# Patient Record
Sex: MALE | Race: OTHER | URBAN - METROPOLITAN AREA
[De-identification: names, ages, dates, MRNs, and addresses within clinical notes are randomized per-mention and may not be internally consistent; named-entity substitution may affect disease eponyms.]

---
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
## 2021-10-22 ENCOUNTER — EMERGENCY (EMERGENCY)
Facility: HOSPITAL | Age: 21
LOS: 0 days | Discharge: HOME | End: 2021-10-22
Attending: EMERGENCY MEDICINE | Admitting: EMERGENCY MEDICINE
Payer: MEDICAID

## 2021-10-22 VITALS
OXYGEN SATURATION: 98 % | WEIGHT: 160.06 LBS | SYSTOLIC BLOOD PRESSURE: 115 MMHG | RESPIRATION RATE: 18 BRPM | DIASTOLIC BLOOD PRESSURE: 58 MMHG | TEMPERATURE: 97 F | HEART RATE: 64 BPM

## 2021-10-22 DIAGNOSIS — J45.901 UNSPECIFIED ASTHMA WITH (ACUTE) EXACERBATION: ICD-10-CM

## 2021-10-22 DIAGNOSIS — Z76.0 ENCOUNTER FOR ISSUE OF REPEAT PRESCRIPTION: ICD-10-CM

## 2021-10-22 PROCEDURE — 99284 EMERGENCY DEPT VISIT MOD MDM: CPT

## 2021-10-22 RX ORDER — ALBUTEROL 90 UG/1
3 AEROSOL, METERED ORAL
Qty: 60 | Refills: 0
Start: 2021-10-22 | End: 2021-10-26

## 2021-10-22 NOTE — ED PROVIDER NOTE - NSFOLLOWUPCLINICS_GEN_ALL_ED_FT
[facility]  Ped Pulmonology  [id number] [place] [address]  Phone: [phone]  Fax:   Follow Up Time: 1-3 Days

## 2021-10-22 NOTE — ED PROVIDER NOTE - OBJECTIVE STATEMENT
20yom w/ pmhx of asthma (never intubated, hospitalized for it once) who present for asthma exacerbation. he usually get this when he goes running or is around cats. today he was running and then couldn't find his albuterol nebulizer machine so he called ems. ems gave him 2 rounds of albuterol duonebs en route and now his symptoms have resolved. he does not feel like he needs any additional treatment. he is requesting albuterol prescription. no f c n v cp sob.

## 2021-10-22 NOTE — ED PROVIDER NOTE - NS ED ROS FT
Constitutional: No fever   Eyes:  No visual changes  Ears:  No hearing changes  Neck: No neck pain  Cardiac:  No chest pain  Respiratory:  +SOB   GI:  No abdominal pain, nausea, or vomiting  :  No dysuria  MS:  No back pain  Neuro:  No headache or weakness.  No LOC  Skin:  No skin rash

## 2021-10-22 NOTE — ED PROVIDER NOTE - PHYSICAL EXAMINATION
CONSTITUTIONAL: well-developed, well-nourished, in no acute distress  SKIN: warm, dry  HEAD: Normocephalic  EYES: no conjunctival erythema  ENT: no nasal discharge, airway clear  NECK: full ROM  CARD: regular rate and rhythm  RESP: normal respiratory effort, no wheezes, rales or rhonchi, good air movements  ABD: soft, non-distended, non-tender  EXT: moving all extremities spontaneously  NEURO: alert and oriented, grossly unremarkable  PSYCH: cooperative, appropriate

## 2021-10-22 NOTE — ED PROVIDER NOTE - PATIENT PORTAL LINK FT
You can access the FollowMyHealth Patient Portal offered by [facility] by registering at the following website: [email or web address]. By joining [employer]’s FollowMyHealth portal, you will also be able to view your health information using other applications (apps) compatible with our system.

## 2021-10-22 NOTE — ED PROVIDER NOTE - ATTENDING CONTRIBUTION TO CARE
pt with asthma exac. resolved with nebs pta. requesting neb filter apparatus and dc home. has no complaints in ED.    lungs clear. exam nml.    supportive care  dc

## 2021-10-22 NOTE — ED PROVIDER NOTE - DISPOSITION TYPE
Please clarify if this patient is being treated/managed for:    =>   chronic diastolic  CHF  =>Other Explanation of clinical findings  =>Unable to Determine (no explanation of clinical findings)    The medical record reflects the following clinical findings, treatment, and risk factors:    Pt s/p  THR    med consult  notes h/o  CHF  with moderate diastolic dysfunction  med  plan \" · Start Coreg tonight, restart lisinopril, HCTZ, and Imdur post-op day 1 pending that there is no significant bump it pt's Cr. Start Statin post-op day 1. Hold aspirin and plavix until Okayed by Ortho. PRN Nitro for angina. \"      Please clarify and document your clinical opinion in the progress notes and discharge summary including the definitive and/or presumptive diagnosis, (suspected or probable), related to the above clinical findings. Please include clinical findings supporting your diagnosis. If you DECLINE this query or would like to communicate with [facility], please utilize the \"[facility] message box\" at the TOP of the Progress Note on the right. QUESTIONS?    Argelia Reid RN BSN  CCDS [phone]
Please clarify stage of pt's CKD  noted in  consult. Pt's  GFR-    46                        **Chronic Kidney Disease (CKD), stages 1-5. Documenting the stage of CKD will improve data integrity and will help clarify vague terms such as \"renal insufficiency\" or \"chronic renal failure. \" The stages of CKD according to the [facility] are as follows:     Stage I: GFR >90   Stage II: GFR 60-89   Stage III: GFR 30-59   Stage IV: GFR 15-29   Stage V: GFR <15    =>  =>Other Explanation of clinical findings  =>Unable to Determine (no explanation of clinical findings)      QUESTIONS?    Rachael Souza RN BSN  CCDS [phone]
DISCHARGE

## 2021-12-19 ENCOUNTER — EMERGENCY (EMERGENCY)
Facility: HOSPITAL | Age: 21
LOS: 0 days | Discharge: HOME | End: 2021-12-20
Attending: EMERGENCY MEDICINE | Admitting: EMERGENCY MEDICINE
Payer: MEDICAID

## 2021-12-19 DIAGNOSIS — R07.89 OTHER CHEST PAIN: ICD-10-CM

## 2021-12-19 DIAGNOSIS — R09.81 NASAL CONGESTION: ICD-10-CM

## 2021-12-19 DIAGNOSIS — J45.901 UNSPECIFIED ASTHMA WITH (ACUTE) EXACERBATION: ICD-10-CM

## 2021-12-19 DIAGNOSIS — Z20.822 CONTACT WITH AND (SUSPECTED) EXPOSURE TO COVID-19: ICD-10-CM

## 2021-12-19 DIAGNOSIS — J02.9 ACUTE PHARYNGITIS, UNSPECIFIED: ICD-10-CM

## 2021-12-19 DIAGNOSIS — B34.1 ENTEROVIRUS INFECTION, UNSPECIFIED: ICD-10-CM

## 2021-12-19 DIAGNOSIS — R06.02 SHORTNESS OF BREATH: ICD-10-CM

## 2021-12-19 DIAGNOSIS — F17.200 NICOTINE DEPENDENCE, UNSPECIFIED, UNCOMPLICATED: ICD-10-CM

## 2021-12-19 PROCEDURE — 99285 EMERGENCY DEPT VISIT HI MDM: CPT

## 2021-12-20 ENCOUNTER — TRANSCRIPTION ENCOUNTER (OUTPATIENT)
Age: 21
End: 2021-12-20

## 2021-12-20 ENCOUNTER — INPATIENT (INPATIENT)
Facility: HOSPITAL | Age: 21
LOS: 0 days | Discharge: HOME | End: 2021-12-21
Attending: PEDIATRICS | Admitting: PEDIATRICS
Payer: MEDICAID

## 2021-12-20 VITALS
HEART RATE: 93 BPM | TEMPERATURE: 100 F | OXYGEN SATURATION: 100 % | RESPIRATION RATE: 18 BRPM | DIASTOLIC BLOOD PRESSURE: 77 MMHG | SYSTOLIC BLOOD PRESSURE: 156 MMHG

## 2021-12-20 VITALS
TEMPERATURE: 98 F | OXYGEN SATURATION: 100 % | SYSTOLIC BLOOD PRESSURE: 127 MMHG | DIASTOLIC BLOOD PRESSURE: 70 MMHG | RESPIRATION RATE: 18 BRPM | HEART RATE: 92 BPM

## 2021-12-20 VITALS
HEART RATE: 92 BPM | TEMPERATURE: 99 F | SYSTOLIC BLOOD PRESSURE: 130 MMHG | RESPIRATION RATE: 18 BRPM | OXYGEN SATURATION: 100 % | DIASTOLIC BLOOD PRESSURE: 85 MMHG | WEIGHT: 149.91 LBS

## 2021-12-20 DIAGNOSIS — J45.909 UNSPECIFIED ASTHMA, UNCOMPLICATED: ICD-10-CM

## 2021-12-20 DIAGNOSIS — J98.2 INTERSTITIAL EMPHYSEMA: ICD-10-CM

## 2021-12-20 LAB
ALBUMIN SERPL ELPH-MCNC: 5.2 G/DL — SIGNIFICANT CHANGE UP (ref 3.5–5.2)
ALBUMIN SERPL ELPH-MCNC: 5.2 G/DL — SIGNIFICANT CHANGE UP (ref 3.5–5.2)
ALP SERPL-CCNC: 82 U/L — SIGNIFICANT CHANGE UP (ref 30–115)
ALP SERPL-CCNC: 85 U/L — SIGNIFICANT CHANGE UP (ref 30–115)
ALT FLD-CCNC: 16 U/L — SIGNIFICANT CHANGE UP (ref 13–38)
ALT FLD-CCNC: 18 U/L — SIGNIFICANT CHANGE UP (ref 13–38)
ANION GAP SERPL CALC-SCNC: 18 MMOL/L — HIGH (ref 7–14)
ANION GAP SERPL CALC-SCNC: 19 MMOL/L — HIGH (ref 7–14)
AST SERPL-CCNC: 18 U/L — SIGNIFICANT CHANGE UP (ref 13–38)
AST SERPL-CCNC: 19 U/L — SIGNIFICANT CHANGE UP (ref 13–38)
BASE EXCESS BLDV CALC-SCNC: 1.3 MMOL/L — SIGNIFICANT CHANGE UP (ref -2–3)
BASOPHILS # BLD AUTO: 0.03 K/UL — SIGNIFICANT CHANGE UP (ref 0–0.2)
BASOPHILS # BLD AUTO: 0.05 K/UL — SIGNIFICANT CHANGE UP (ref 0–0.2)
BASOPHILS NFR BLD AUTO: 0.2 % — SIGNIFICANT CHANGE UP (ref 0–1)
BASOPHILS NFR BLD AUTO: 0.3 % — SIGNIFICANT CHANGE UP (ref 0–1)
BILIRUB SERPL-MCNC: 0.4 MG/DL — SIGNIFICANT CHANGE UP (ref 0.2–1.2)
BILIRUB SERPL-MCNC: 0.7 MG/DL — SIGNIFICANT CHANGE UP (ref 0.2–1.2)
BUN SERPL-MCNC: 11 MG/DL — SIGNIFICANT CHANGE UP (ref 10–20)
BUN SERPL-MCNC: 13 MG/DL — SIGNIFICANT CHANGE UP (ref 10–20)
CA-I SERPL-SCNC: 1.25 MMOL/L — SIGNIFICANT CHANGE UP (ref 1.15–1.33)
CALCIUM SERPL-MCNC: 10.4 MG/DL — HIGH (ref 8.5–10.1)
CALCIUM SERPL-MCNC: 10.4 MG/DL — HIGH (ref 8.5–10.1)
CHLORIDE SERPL-SCNC: 101 MMOL/L — SIGNIFICANT CHANGE UP (ref 98–110)
CHLORIDE SERPL-SCNC: 99 MMOL/L — SIGNIFICANT CHANGE UP (ref 98–110)
CO2 SERPL-SCNC: 17 MMOL/L — SIGNIFICANT CHANGE UP (ref 17–32)
CO2 SERPL-SCNC: 19 MMOL/L — SIGNIFICANT CHANGE UP (ref 17–32)
CREAT SERPL-MCNC: 1 MG/DL — SIGNIFICANT CHANGE UP (ref 0.7–1.5)
CREAT SERPL-MCNC: 1 MG/DL — SIGNIFICANT CHANGE UP (ref 0.7–1.5)
EOSINOPHIL # BLD AUTO: 0.02 K/UL — SIGNIFICANT CHANGE UP (ref 0–0.7)
EOSINOPHIL # BLD AUTO: 0.4 K/UL — SIGNIFICANT CHANGE UP (ref 0–0.7)
EOSINOPHIL NFR BLD AUTO: 0.1 % — SIGNIFICANT CHANGE UP (ref 0–8)
EOSINOPHIL NFR BLD AUTO: 2.6 % — SIGNIFICANT CHANGE UP (ref 0–8)
GAS PNL BLDV: 134 MMOL/L — LOW (ref 136–145)
GAS PNL BLDV: SIGNIFICANT CHANGE UP
GLUCOSE SERPL-MCNC: 108 MG/DL — HIGH (ref 70–99)
GLUCOSE SERPL-MCNC: 82 MG/DL — SIGNIFICANT CHANGE UP (ref 70–99)
HCO3 BLDV-SCNC: 25 MMOL/L — SIGNIFICANT CHANGE UP (ref 22–29)
HCT VFR BLD CALC: 43.2 % — SIGNIFICANT CHANGE UP (ref 42–52)
HCT VFR BLD CALC: 47.1 % — SIGNIFICANT CHANGE UP (ref 42–52)
HCT VFR BLDA CALC: 46 % — SIGNIFICANT CHANGE UP (ref 39–51)
HGB BLD CALC-MCNC: 15.3 G/DL — SIGNIFICANT CHANGE UP (ref 12.6–17.4)
HGB BLD-MCNC: 15.5 G/DL — SIGNIFICANT CHANGE UP (ref 14–18)
HGB BLD-MCNC: 16.4 G/DL — SIGNIFICANT CHANGE UP (ref 14–18)
IMM GRANULOCYTES NFR BLD AUTO: 0.3 % — SIGNIFICANT CHANGE UP (ref 0.1–0.3)
IMM GRANULOCYTES NFR BLD AUTO: 0.4 % — HIGH (ref 0.1–0.3)
LACTATE BLDV-MCNC: 1.4 MMOL/L — SIGNIFICANT CHANGE UP (ref 0.5–2)
LYMPHOCYTES # BLD AUTO: 1.13 K/UL — LOW (ref 1.2–3.4)
LYMPHOCYTES # BLD AUTO: 1.38 K/UL — SIGNIFICANT CHANGE UP (ref 1.2–3.4)
LYMPHOCYTES # BLD AUTO: 6.9 % — LOW (ref 20.5–51.1)
LYMPHOCYTES # BLD AUTO: 9.1 % — LOW (ref 20.5–51.1)
MAGNESIUM SERPL-MCNC: 2.3 MG/DL — SIGNIFICANT CHANGE UP (ref 1.8–2.4)
MAGNESIUM SERPL-MCNC: 2.3 MG/DL — SIGNIFICANT CHANGE UP (ref 1.8–2.4)
MCHC RBC-ENTMCNC: 29.9 PG — SIGNIFICANT CHANGE UP (ref 27–31)
MCHC RBC-ENTMCNC: 30.8 PG — SIGNIFICANT CHANGE UP (ref 27–31)
MCHC RBC-ENTMCNC: 34.8 G/DL — SIGNIFICANT CHANGE UP (ref 32–37)
MCHC RBC-ENTMCNC: 35.9 G/DL — SIGNIFICANT CHANGE UP (ref 32–37)
MCV RBC AUTO: 85.9 FL — SIGNIFICANT CHANGE UP (ref 80–94)
MCV RBC AUTO: 85.9 FL — SIGNIFICANT CHANGE UP (ref 80–94)
MONOCYTES # BLD AUTO: 1.32 K/UL — HIGH (ref 0.1–0.6)
MONOCYTES # BLD AUTO: 1.5 K/UL — HIGH (ref 0.1–0.6)
MONOCYTES NFR BLD AUTO: 8.7 % — SIGNIFICANT CHANGE UP (ref 1.7–9.3)
MONOCYTES NFR BLD AUTO: 9.2 % — SIGNIFICANT CHANGE UP (ref 1.7–9.3)
NEUTROPHILS # BLD AUTO: 12 K/UL — HIGH (ref 1.4–6.5)
NEUTROPHILS # BLD AUTO: 13.52 K/UL — HIGH (ref 1.4–6.5)
NEUTROPHILS NFR BLD AUTO: 79 % — HIGH (ref 42.2–75.2)
NEUTROPHILS NFR BLD AUTO: 83.2 % — HIGH (ref 42.2–75.2)
NRBC # BLD: 0 /100 WBCS — SIGNIFICANT CHANGE UP (ref 0–0)
NRBC # BLD: 0 /100 WBCS — SIGNIFICANT CHANGE UP (ref 0–0)
PCO2 BLDV: 36 MMHG — LOW (ref 42–55)
PH BLDV: 7.45 — HIGH (ref 7.32–7.43)
PLATELET # BLD AUTO: 220 K/UL — SIGNIFICANT CHANGE UP (ref 130–400)
PLATELET # BLD AUTO: 226 K/UL — SIGNIFICANT CHANGE UP (ref 130–400)
PO2 BLDV: 31 MMHG — SIGNIFICANT CHANGE UP
POTASSIUM BLDV-SCNC: 3.5 MMOL/L — SIGNIFICANT CHANGE UP (ref 3.5–5.1)
POTASSIUM SERPL-MCNC: 3.7 MMOL/L — SIGNIFICANT CHANGE UP (ref 3.5–5)
POTASSIUM SERPL-MCNC: 4.1 MMOL/L — SIGNIFICANT CHANGE UP (ref 3.5–5)
POTASSIUM SERPL-SCNC: 3.7 MMOL/L — SIGNIFICANT CHANGE UP (ref 3.5–5)
POTASSIUM SERPL-SCNC: 4.1 MMOL/L — SIGNIFICANT CHANGE UP (ref 3.5–5)
PROT SERPL-MCNC: 8.6 G/DL — HIGH (ref 6–8)
PROT SERPL-MCNC: 8.7 G/DL — HIGH (ref 6–8)
RAPID RVP RESULT: DETECTED
RBC # BLD: 5.03 M/UL — SIGNIFICANT CHANGE UP (ref 4.7–6.1)
RBC # BLD: 5.48 M/UL — SIGNIFICANT CHANGE UP (ref 4.7–6.1)
RBC # FLD: 12.2 % — SIGNIFICANT CHANGE UP (ref 11.5–14.5)
RBC # FLD: 12.6 % — SIGNIFICANT CHANGE UP (ref 11.5–14.5)
RV+EV RNA SPEC QL NAA+PROBE: DETECTED
SAO2 % BLDV: 56 % — SIGNIFICANT CHANGE UP
SARS-COV-2 RNA SPEC QL NAA+PROBE: SIGNIFICANT CHANGE UP
SODIUM SERPL-SCNC: 135 MMOL/L — SIGNIFICANT CHANGE UP (ref 135–146)
SODIUM SERPL-SCNC: 138 MMOL/L — SIGNIFICANT CHANGE UP (ref 135–146)
WBC # BLD: 15.2 K/UL — HIGH (ref 4.8–10.8)
WBC # BLD: 16.27 K/UL — HIGH (ref 4.8–10.8)
WBC # FLD AUTO: 15.2 K/UL — HIGH (ref 4.8–10.8)
WBC # FLD AUTO: 16.27 K/UL — HIGH (ref 4.8–10.8)

## 2021-12-20 PROCEDURE — 71046 X-RAY EXAM CHEST 2 VIEWS: CPT | Mod: 26

## 2021-12-20 PROCEDURE — 71045 X-RAY EXAM CHEST 1 VIEW: CPT | Mod: 26,59

## 2021-12-20 PROCEDURE — 99232 SBSQ HOSP IP/OBS MODERATE 35: CPT

## 2021-12-20 PROCEDURE — 99285 EMERGENCY DEPT VISIT HI MDM: CPT

## 2021-12-20 PROCEDURE — 99222 1ST HOSP IP/OBS MODERATE 55: CPT

## 2021-12-20 RX ORDER — IPRATROPIUM/ALBUTEROL SULFATE 18-103MCG
3 AEROSOL WITH ADAPTER (GRAM) INHALATION ONCE
Refills: 0 | Status: COMPLETED | OUTPATIENT
Start: 2021-12-20 | End: 2021-12-20

## 2021-12-20 RX ORDER — MONTELUKAST 4 MG/1
10 TABLET, CHEWABLE ORAL AT BEDTIME
Refills: 0 | Status: DISCONTINUED | OUTPATIENT
Start: 2021-12-20 | End: 2021-12-21

## 2021-12-20 RX ORDER — ALBUTEROL 90 UG/1
2 AEROSOL, METERED ORAL ONCE
Refills: 0 | Status: DISCONTINUED | OUTPATIENT
Start: 2021-12-20 | End: 2021-12-20

## 2021-12-20 RX ORDER — MAGNESIUM SULFATE 500 MG/ML
2 VIAL (ML) INJECTION ONCE
Refills: 0 | Status: COMPLETED | OUTPATIENT
Start: 2021-12-20 | End: 2021-12-20

## 2021-12-20 RX ORDER — FLUTICASONE PROPIONATE 220 MCG
2 AEROSOL WITH ADAPTER (GRAM) INHALATION
Refills: 0 | Status: DISCONTINUED | OUTPATIENT
Start: 2021-12-20 | End: 2021-12-21

## 2021-12-20 RX ORDER — IBUPROFEN 200 MG
200 TABLET ORAL EVERY 6 HOURS
Refills: 0 | Status: DISCONTINUED | OUTPATIENT
Start: 2021-12-20 | End: 2021-12-21

## 2021-12-20 RX ORDER — ALBUTEROL 90 UG/1
2 AEROSOL, METERED ORAL
Qty: 18 | Refills: 0
Start: 2021-12-20 | End: 2022-01-03

## 2021-12-20 RX ORDER — DEXAMETHASONE 0.5 MG/5ML
6 ELIXIR ORAL ONCE
Refills: 0 | Status: COMPLETED | OUTPATIENT
Start: 2021-12-20 | End: 2021-12-20

## 2021-12-20 RX ORDER — ALBUTEROL 90 UG/1
2 AEROSOL, METERED ORAL
Refills: 0 | Status: DISCONTINUED | OUTPATIENT
Start: 2021-12-20 | End: 2021-12-21

## 2021-12-20 RX ORDER — ACETAMINOPHEN 500 MG
650 TABLET ORAL EVERY 6 HOURS
Refills: 0 | Status: DISCONTINUED | OUTPATIENT
Start: 2021-12-20 | End: 2021-12-21

## 2021-12-20 RX ORDER — IPRATROPIUM/ALBUTEROL SULFATE 18-103MCG
3 AEROSOL WITH ADAPTER (GRAM) INHALATION
Refills: 0 | Status: DISCONTINUED | OUTPATIENT
Start: 2021-12-20 | End: 2021-12-21

## 2021-12-20 RX ADMIN — Medication 3 MILLILITER(S): at 20:52

## 2021-12-20 RX ADMIN — ALBUTEROL 2 PUFF(S): 90 AEROSOL, METERED ORAL at 23:00

## 2021-12-20 RX ADMIN — Medication 3 MILLILITER(S): at 01:29

## 2021-12-20 RX ADMIN — Medication 3 MILLILITER(S): at 00:47

## 2021-12-20 RX ADMIN — Medication 150 GRAM(S): at 20:19

## 2021-12-20 RX ADMIN — Medication 6 MILLIGRAM(S): at 20:19

## 2021-12-20 RX ADMIN — Medication 3 MILLILITER(S): at 20:19

## 2021-12-20 RX ADMIN — Medication 150 GRAM(S): at 01:29

## 2021-12-20 NOTE — H&P PEDIATRIC - ASSESSMENT
Assessment:    Plan:    Assessment:    Plan:   RESP:  - RA  - Albuterol 90 mcg 2 puff q2h  - Singulair 10 mg QHS  - Flovent 110 mcg 2 puffs BID  - Prednisone 40 mg q12h (starts at 8 am)    CVS:  - Stable    FENGI:  - Soft Pediatric Diet   Assessment: 22 yo M with PMH of asthma presents with chest tightness and neck pain for three days found to have pneumomediastinum on CXR and RE+ admitted for management and monitoring. Vitals and labs are WNL, Patient is tolerating room air without discomfort. Patient CXRs show a decrease in the pneumomediastinum. These are indicative signs of improvement. However on physical exam, lungs sound congested, with wheezing heard and Albuterol q2h will help improve the pneumomediastinum and lung exam. Patient admits to not following a pulmonologist, therefore, plan to set up care with one will help patient control his asthma.  Based on the history, patient presentation is likely due to the combination of rhinoentero virus positive, smoking weed and vaping, in addition to exposure to cats.     Plan:   RESP:  - RA  - Albuterol 90 mcg 2 puff q2h  - Singulair 10 mg QHS  - Flovent 110 mcg 2 puffs BID  - Prednisone 40 mg q12h (starts at 8 am)  - Consult    CVS:  - CVS  - CXR: pneumomediastinum    FENGI:  - Soft, bite-sized Pediatric Diet    ID:  - RE+ (contact precautions)  - COVID negative (12/20)  - Tylenol 650 mg q6 PO PRN for pain  - Motrin 200 mg q6 PO PRN for pain

## 2021-12-20 NOTE — ED PROVIDER NOTE - NS ED ROS FT
Constitutional: See HPI.  Pt eating and drinking normally  Eyes: No discharge, erythema, pain, vision changes.  ENMT: (+) NASAL CONGESTION.  Cardiac: No hx of known congenital defects. CHEST TIGHTNESS  Respiratory: MILD WHEEZES, DRY COUGH   GI: No nausea, vomiting, diarrhea or pain  MS: No muscle weakness, myalgia, joint pain, back pain  Neuro: No headache or weakness. No LOC.  Skin: No skin rash.

## 2021-12-20 NOTE — ED PROVIDER NOTE - ATTENDING CONTRIBUTION TO CARE
20 y/o m w/ pmhx of asthma, previous hospitalizations not bipap pr intubations, seen in ed earlier today for sob, received duonebs and steroid between urgent care visit and then ed visit here, has lab work done and cxr done in ed, cxr showing : "upper pneumomediastinum is noted, which may be related to patient's known asthma.", and (+) enterovirus.     (+) daily marijuana smoker and vaper use. 22 y/o m w/ pmhx of asthma, previous hospitalizations not bipap pr intubations, seen in ed earlier today for sob, received duonebs and steroid between urgent care visit and then ed visit here, has lab work done and cxr done in ed, cxr showing : "upper pneumomediastinum is noted, which may be related to patient's known asthma.", and (+) enterovirus, presents today for xray read. pt reports sob improved but it still present, associated with chest tightness from dry cough, and expiratory wheezing. last took steroids from urgent care earlier but nothing this evening. pt also reports tightness in upper neck, but no sore throat, no neck swelling/stiffness.   No fever, chills, n/v, palpitations, diaphoresis, ear pain, ha/lh/dizziness, numbness/tingling, neck pain/ stiffness, dysphagia, odynophagia, drooling/secretions, abd pain, diarrhea, constipation, melena/brbpr, urinary symptoms, trauma, weakness, edema, calf pain/swelling/erythema, sick contacts, recent travel or rash.  (+) daily marijuana smoker and reports quit vaper use.    on exam: WDWN appearing non toxic appearing sitting on stretcher in nad, speaking full sentences, no rash, mmm, no erythema, exudates, or petechiae, no drooling/secretions, no PTA, no torticollis, no anterior neck pain/stiffness. no meningeal signs, no crepitus to upper neck, no subq emphysema, no rhinorrhea, TM's visualized b/l with good cone of light, no erythema or effusions, regular rate, radial pulses 2/4 b/l, no jvd, congested, breath sounds present b/l, expiratory wheezing present b/l, good respiratory effort, no accessory muscle use, no tachypnea, no stridor, bs present throughout all 4 quadrants, abd soft, nd, nt, no rebound tenderness or guarding, no cvat, no pelvic pain to palpation, no suprapubic pain to palpation, FROM of ext, no edema, no calf pain/swelling/erythema, AAOx3.  motor 5/5 and sensation intact throughout upper and lower ext. No focal deficits. 20 y/o m w/ pmhx of asthma, previous hospitalizations not bipap pr intubations, seen in ed earlier today for sob, received duonebs and steroid between urgent care visit and then ed visit here, has lab work done and cxr done in ed, cxr showing : "upper pneumomediastinum is noted, which may be related to patient's known asthma.", and (+) enterovirus, presents today for xray read. pt reports sob improved but it still present, associated with chest tightness from dry cough, and expiratory wheezing. last took steroids from urgent care earlier but nothing this evening. pt also reports tightness in upper neck, but no sore throat, no neck swelling/stiffness.   No fever, chills, n/v, palpitations, diaphoresis, ear pain, ha/lh/dizziness, numbness/tingling, neck pain/ stiffness, dysphagia, odynophagia, drooling/secretions, abd pain, diarrhea, constipation, melena/brbpr, urinary symptoms, trauma, weakness, edema, calf pain/swelling/erythema, sick contacts, recent travel or rash.  (+) daily marijuana smoker and reports quit vaper use. Does not have a pulmonologist.     on exam: WDWN appearing non toxic appearing sitting on stretcher in nad, speaking full sentences, no rash, mmm, no erythema, exudates, or petechiae, no drooling/secretions, no PTA, no torticollis, no anterior neck pain/stiffness. no meningeal signs, no crepitus to upper neck, no subq emphysema, no rhinorrhea, TM's visualized b/l with good cone of light, no erythema or effusions, regular rate, radial pulses 2/4 b/l, no jvd, congested, breath sounds present b/l, expiratory wheezing present b/l, good respiratory effort, no accessory muscle use, no tachypnea, no stridor, saturation 100 on ra at rest and with exertion, bs present throughout all 4 quadrants, abd soft, nd, nt, no rebound tenderness or guarding, no cvat, no pelvic pain to palpation, no suprapubic pain to palpation, FROM of ext, no edema, no calf pain/swelling/erythema, AAOx3.  motor 5/5 and sensation intact throughout upper and lower ext. No focal deficits.

## 2021-12-20 NOTE — H&P PEDIATRIC - NSHPPHYSICALEXAM_GEN_ALL_CORE
Physical Exam:  GENERAL: well-appearing, well nourished, no acute distress, AOx3  HEENT: NCAT, conjunctiva clear and not injected, sclera non-icteric, PERRLA, EACs clear, TMs nonbulging/nonerythematous, nares patent, mucous membranes moist, no mucosal lesions, pharynx nonerythematous, no tonsillar hypertrophy or exudate, neck supple, no cervical lymphadenopathy  HEART: RRR, S1, S2, no rubs, murmurs, or gallops, RP/DP present, cap refill <2 seconds  LUNG: CTAB, no wheezing, no ronchi, no crackles, no retractions, no belly breathing, no tachypnea  ABDOMEN: +BS, soft, nontender, nondistended, no hepatomegaly, no splenomegaly, no hernia  NEURO/MSK: grossly intact  NEURO: CNII-XII grossly intact, EOMI, no dysmetria, DTRs normal b/l, no ataxia, sensation intact to light touch, negative Babinski  MUSCULOSKELETAL: passive and active ROM intact, 5/5 strength upper and lower extremities  SKIN: good turgor, no rash, no bruising or prominent lesions  BACK: spine normal without deformity or tenderness, no CVA tenderness  RECTAL: normal sphincter tone, no hemorrhoids or masses palpable  EXTREMITIES: No amputations or deformities, cyanosis, edema or varicosities, peripheral pulses intact  PSYCHIATRIC: Oriented X3, intact recent and remote memory, judgment and insight, normal mood and affect  MALE : Penis circumcised without lesions, urethral meatus normal location without discharge, testes and epididymides normal size without masses, scrotum without lesions, cremasteric reflex present b/l Physical Exam:  GENERAL: well-appearing, well nourished, no acute distress, AOx3, patient speaking in full sentences, maintaining good eye contact and comprehension   HEENT: NCAT, conjunctiva clear and not injected, sclera non-icteric, PERRLA, nares patent, mucous membranes moist, no mucosal lesions, pharynx nonerythematous, no tonsillar hypertrophy or exudate, neck supple, no cervical lymphadenopathy, tender to touch over right neck area  HEART: RRR, S1, S2, no rubs, murmurs, or gallops, RP/DP present, cap refill <2 seconds  CHEST: Nontender  LUNG: CTAB, wheezing over b/l lung fiel, no ronchi, no crackles, no retractions, no belly breathing, no tachypnea  ABDOMEN: +BS, soft, nontender, nondistended, no hepatomegaly, no splenomegaly, no hernia  BACK: spine normal without deformity or tenderness, no CVA tenderness  EXTREMITIES: No amputations or deformities, cyanosis, edema or varicosities Physical Exam:  GENERAL: well-appearing, well nourished, no acute distress, AOx3, patient speaking in full sentences, maintaining good eye contact and comprehension   HEENT: NCAT, conjunctiva clear and not injected, sclera non-icteric, PERRLA, nares patent, mucous membranes moist, no mucosal lesions, pharynx nonerythematous, no tonsillar hypertrophy or exudate, neck supple, no cervical lymphadenopathy, tender to touch over right neck area  HEART: RRR, S1, S2, no rubs, murmurs, or gallops, RP/DP present, cap refill <2 seconds  CHEST: Nontender  LUNG: CTAB, wheezing over b/l lung fields, Lower lung fields congested, no ronchi, no retractions, no belly breathing, no tachypnea  ABDOMEN: +BS, soft, nontender, nondistended, no hepatomegaly, no splenomegaly, no hernia  BACK: spine normal without deformity or tenderness, no CVA tenderness  EXTREMITIES: No amputations or deformities, cyanosis, edema or varicosities

## 2021-12-20 NOTE — H&P PEDIATRIC - NSHPREVIEWOFSYSTEMS_GEN_ALL_CORE
Review of Systems  Constitutional: (-) fever (-) weakness (-) diaphoresis (+) pain  Eyes: (-) change in vision (-) photophobia (-) eye pain  ENT: (-) sore throat (-) ear pain  (-) nasal discharge (-) congestion  Cardiovascular: (+) chest pain (-) palpitations  Respiratory: (-) SOB (+) cough (-) WOB (-) wheeze (+) tightness  GI: (-) abdominal pain (-) nausea (-) vomiting (+) diarrhea (-) constipation  : (-) dysuria (-) hematuria (-) increased frequency (-) increased urgency  Integumentary: (-) rash (-) redness (-) joint pain (-) MSK pain (-) swelling  Neurological:  (-) focal deficit (-) altered mental status (-) dizziness (-) headache  General: (-) recent travel (+) sick contacts (-) decreased PO (-) urine output

## 2021-12-20 NOTE — ED PROVIDER NOTE - OBJECTIVE STATEMENT
Patient is a 21y M with pmhx of Asthma requiring hospitalizations but no BIPAP or intubations coming in with shortness of breath. Patient went to urgent care and received duonebs and steroids, but his symptoms had not improved. Patient also has been having a sore throat and congestion for the past week as well. He is having difficulty speaking full sentences and catching his breath. Patient says that he is an every day marijuana smoker and vaper and usually he smokes when he gets anxious, but today it did not help him. He says he stopped smoking when he felt short of breath, prior to going to urgent care.

## 2021-12-20 NOTE — H&P PEDIATRIC - NSHPLABSRESULTS_GEN_ALL_CORE
16.4   15.20 )-----------( 226      ( 20 Dec 2021 20:29 )             47.1   12-20    135  |  99  |  13  ----------------------------<  82  4.1   |  17  |  1.0    Ca    10.4<H>      20 Dec 2021 20:29  Mg     2.3     12-20    TPro  8.6<H>  /  Alb  5.2  /  TBili  0.7  /  DBili  x   /  AST  19  /  ALT  16  /  AlkPhos  85  12-20    Magnesium, Serum (12.20.21 @ 20:29)    Magnesium, Serum: 2.3 mg/dL    Comprehensive Metabolic Panel (12.20.21 @ 20:29)    Sodium, Serum: 135 mmol/L    Potassium, Serum: 4.1 mmol/L    Chloride, Serum: 99 mmol/L    Carbon Dioxide, Serum: 17 mmol/L    Anion Gap, Serum: 19 mmol/L    Blood Urea Nitrogen, Serum: 13 mg/dL    Creatinine, Serum: 1.0 mg/dL    Glucose, Serum: 82 mg/dL    Calcium, Total Serum: 10.4 mg/dL    Protein Total, Serum: 8.6 g/dL    Albumin, Serum: 5.2 g/dL    Bilirubin Total, Serum: 0.7 mg/dL    Alkaline Phosphatase, Serum: 85 U/L    Aspartate Aminotransferase (AST/SGOT): 19 U/L    Alanine Aminotransferase (ALT/SGPT): 16 U/L    eGFR if Non : 108

## 2021-12-20 NOTE — ED PROVIDER NOTE - CLINICAL SUMMARY MEDICAL DECISION MAKING FREE TEXT BOX
I have fully discussed the medical management and delivery of care with the patient. I have discussed any available labs, imaging and treatment options with the patient.  Pt admitted for further care & management.  Pediatric team aware of pt and admission.

## 2021-12-20 NOTE — ED PROVIDER NOTE - PATIENT PORTAL LINK FT
You can access the FollowMyHealth Patient Portal offered by [facility] by registering at the following website: [email or web address]. By joining [facility]’s FollowMyHealth portal, you will also be able to view your health information using other applications (apps) compatible with our system.

## 2021-12-20 NOTE — ED ADULT NURSE NOTE - PRO INTERPRETER NEED 2
English How Severe Are Your Spot(S)?: mild Have Your Spot(S) Been Treated In The Past?: has not been treated Hpi Title: Evaluation of Skin Lesions

## 2021-12-20 NOTE — CHART NOTE - NSCHARTNOTEFT_GEN_A_CORE
RACHEL STATON  MRN-[id number]    Rachel is a 21y M w/ pmh moderate severe asthma p/w 3 days of cough and chest/throat pain, admitted for treatment of asthma and monitoring of pneumomediastinum. He started having cough and "knot in throat" 2 weeks ago, went to  and was given steroids, with improvement in symptoms. 3 days ago he developed cough again, with worsening chest and throat pain, went to  that day and was given ipratropium, singulair, and steroids, was taking albuterol and ipratropium nebs at home but was getting sob with walking so came to ED. In the ED this AM he was given decadron and duonebs x 3, and had cxr, with improvement in symptoms, sent home. Was called back when CXR read as having pneumomediastinum. Able to eat and drink at home but feels knot in throat, prefers soft foods. No fever or vomiting, had a couple of days of diarrhea a few days ago, now resolved. Hx of asthma, with admission at age 12, and ER visits a few times in past, most recently this October, no hx of intubation. Says he has been coughing up mucus, but also thinks that his symptoms could be due to exposure to girlfriends cat, is allergic to cats and recently slept over there. She just got over a sore throat a few days ago. He takes albuterol most days, usually due to coughing/sob with exercise, rarely needs at nighttime. Smokes marijuana daily for anxiety, used to vape tobacco and marijuana daily but has not for the past month.       ED Course: albuterol x 2, decadron, Mg     Review of Systems  Constitutional: (-) fever (-) weakness (-) diaphoresis (-) pain  Eyes: (-) change in vision (-) photophobia (-) eye pain  ENT: (+) sore throat (-) ear pain  (-) nasal discharge (+) congestion  Cardiovascular: (-) chest pain (-) palpitations  Respiratory: (+) SOB (+) cough (-) WOB (+) wheeze (+) tightness  GI: (-) abdominal pain (-) nausea (-) vomiting (+) diarrhea (-) constipation  Integumentary: (-) rash (-) redness (-) joint pain (-) MSK pain (-) swelling  Neurological:  (-) focal deficit (-) altered mental status (-) dizziness (-) headache  General: (-) recent travel (+) sick contacts (-) decreased PO (-) decreased urine output     Vital Signs Last 24 Hrs  T(C): 37.5 (20 Dec 2021 18:15), Max: 37.5 (20 Dec 2021 18:15)  T(F): 99.5 (20 Dec 2021 18:15), Max: 99.5 (20 Dec 2021 18:15)  HR: 93 (20 Dec 2021 18:15) (92 - 93)  BP: 156/77 (20 Dec 2021 18:15) (127/70 - 156/77)  BP(mean): --  RR: 18 (20 Dec 2021 18:15) (18 - 18)  SpO2: 100% (20 Dec 2021 18:15) (100% - 100%)    I&O's Summary      Drug Dosing Weight    Weight (kg): 68 (20 Dec 2021 05:27)    Physical Exam:  Constitutional: No acute distress, well appearing, alert and active  Eyes: PERRLA, no conjunctival injection, no eye discharge, EOMI  ENMT: No nasal congestion, no nasal discharge, normal oropharynx, no exudates, no sores,  clear TMS bilateral.   Neck: Supple, no lymphadenopathy  Respiratory: Clear lung sounds bilateral, no wheeze, crackle or rhonchi  Cardiovascular: S1, S2, no murmur, RRR  Gastrointestinal: Bowel sounds positive, Soft, nondistended, nontender  Skin: No rash      Medications:  MEDICATIONS  (STANDING):  albuterol/ipratropium for Nebulization.. 3 milliLiter(s) Nebulizer every 20 minutes    MEDICATIONS  (PRN):      Labs:  CBC Full  -  ( 20 Dec 2021 20:29 )  WBC Count : 15.20 K/uL  RBC Count : 5.48 M/uL  Hemoglobin : 16.4 g/dL  Hematocrit : 47.1 %  Platelet Count - Automated : 226 K/uL  Mean Cell Volume : 85.9 fL  Mean Cell Hemoglobin : 29.9 pg  Mean Cell Hemoglobin Concentration : 34.8 g/dL  Auto Neutrophil # : 12.00 K/uL  Auto Lymphocyte # : 1.38 K/uL  Auto Monocyte # : 1.32 K/uL  Auto Eosinophil # : 0.40 K/uL  Auto Basophil # : 0.05 K/uL  Auto Neutrophil % : 79.0 %  Auto Lymphocyte % : 9.1 %  Auto Monocyte % : 8.7 %  Auto Eosinophil % : 2.6 %  Auto Basophil % : 0.3 %      12-20    135  |  99  |  13  ----------------------------<  82  4.1   |  17  |  1.0    Ca    10.4<H>      20 Dec 2021 20:29  Mg     2.3     12-20    TPro  8.6<H>  /  Alb  5.2  /  TBili  0.7  /  DBili  x   /  AST  19  /  ALT  16  /  AlkPhos  85  12-20    LIVER FUNCTIONS - ( 20 Dec 2021 20:29 )  Alb: 5.2 g/dL / Pro: 8.6 g/dL / ALK PHOS: 85 U/L / ALT: 16 U/L / AST: 19 U/L / GGT: x             Radiology:  < from: Xray Chest 2 Views PA/Lat (12.20.21 @ 19:30) >  Impression:  Upper pneumomediastinum is noted, which may be related to patient's known   asthma.          Assessment:    Plan:     *  HPI: RACHEL STATON  MRN-[id number]    Rachel is a 21y M w/ pmh moderate severe asthma p/w 3 days of cough and chest/throat pain, admitted for treatment of asthma and monitoring of pneumomediastinum. He started having cough and "knot in throat" 2 weeks ago, went to  and was given steroids, with improvement in symptoms. 3 days ago he developed cough again, with worsening chest and throat pain, went to  that day and was given ipratropium, singulair, and steroids, was taking albuterol and ipratropium nebs at home but was getting sob with walking so came to ED. In the ED this AM he was given decadron and duonebs x 3, and had cxr, with improvement in symptoms, sent home. Was called back when CXR read as having pneumomediastinum. Able to eat and drink at home but feels knot in throat, prefers soft foods. No fever or vomiting, had a couple of days of diarrhea a few days ago, now resolved. Hx of asthma, with admission at age 12, and ER visits a few times in past, most recently this October, no hx of intubation. Says he has been coughing up mucus, but also thinks that his symptoms could be due to exposure to girlfriends cat, is allergic to cats and recently slept over there. She just got over a sore throat a few days ago. He takes albuterol most days, usually due to coughing/sob with exercise, rarely needs at nighttime. Smokes marijuana daily for anxiety, used to vape tobacco and marijuana daily but has not for the past month.       ED Course: albuterol x 2, decadron, Mg     Review of Systems  Constitutional: (-) fever (-) weakness (-) diaphoresis (-) pain  Eyes: (-) change in vision (-) photophobia (-) eye pain  ENT: (+) sore throat (-) ear pain  (-) nasal discharge (+) congestion  Cardiovascular: (-) chest pain (-) palpitations  Respiratory: (+) SOB (+) cough (-) WOB (+) wheeze (+) tightness  GI: (-) abdominal pain (-) nausea (-) vomiting (+) diarrhea (-) constipation  Integumentary: (-) rash (-) redness (-) joint pain (-) MSK pain (-) swelling  Neurological:  (-) focal deficit (-) altered mental status (-) dizziness (-) headache  General: (-) recent travel (+) sick contacts (-) decreased PO (-) decreased urine output     Vital Signs Last 24 Hrs  T(C): 37.5 (20 Dec 2021 18:15), Max: 37.5 (20 Dec 2021 18:15)  T(F): 99.5 (20 Dec 2021 18:15), Max: 99.5 (20 Dec 2021 18:15)  HR: 93 (20 Dec 2021 18:15) (92 - 93)  BP: 156/77 (20 Dec 2021 18:15) (127/70 - 156/77)  BP(mean): --  RR: 18 (20 Dec 2021 18:15) (18 - 18)  SpO2: 100% (20 Dec 2021 18:15) (100% - 100%)    I&O's Summary      Drug Dosing Weight    Weight (kg): 68 (20 Dec 2021 05:27)    Physical Exam:  Constitutional: No acute distress, well appearing, alert and active  Eyes: PERRLA, no conjunctival injection, no eye discharge, EOMI  ENMT: No nasal congestion, no nasal discharge, normal oropharynx, no exudates, no sores  Neck: Supple, no lymphadenopathy. No tenderness to palpation.   Respiratory: adequate air entry in b/l lobes but decreased in lower lobes, + inspiratory and expiratory wheeze, no crackle or rhonchi. No retractions or nasal flaring.   Cardiovascular: S1, S2, no murmur, RRR  Gastrointestinal: Bowel sounds positive, Soft, nondistended, nontender  MSK: no tenderness to palpation over chest.   Skin: No rash      Medications:  MEDICATIONS  (STANDING):  albuterol/ipratropium for Nebulization.. 3 milliLiter(s) Nebulizer every 20 minutes    MEDICATIONS  (PRN):      Labs:  CBC Full  -  ( 20 Dec 2021 20:29 )  WBC Count : 15.20 K/uL  RBC Count : 5.48 M/uL  Hemoglobin : 16.4 g/dL  Hematocrit : 47.1 %  Platelet Count - Automated : 226 K/uL  Mean Cell Volume : 85.9 fL  Mean Cell Hemoglobin : 29.9 pg  Mean Cell Hemoglobin Concentration : 34.8 g/dL  Auto Neutrophil # : 12.00 K/uL  Auto Lymphocyte # : 1.38 K/uL  Auto Monocyte # : 1.32 K/uL  Auto Eosinophil # : 0.40 K/uL  Auto Basophil # : 0.05 K/uL  Auto Neutrophil % : 79.0 %  Auto Lymphocyte % : 9.1 %  Auto Monocyte % : 8.7 %  Auto Eosinophil % : 2.6 %  Auto Basophil % : 0.3 %      12-20    135  |  99  |  13  ----------------------------<  82  4.1   |  17  |  1.0    Ca    10.4<H>      20 Dec 2021 20:29  Mg     2.3     12-20    TPro  8.6<H>  /  Alb  5.2  /  TBili  0.7  /  DBili  x   /  AST  19  /  ALT  16  /  AlkPhos  85  12-20    LIVER FUNCTIONS - ( 20 Dec 2021 20:29 )  Alb: 5.2 g/dL / Pro: 8.6 g/dL / ALK PHOS: 85 U/L / ALT: 16 U/L / AST: 19 U/L / GGT: x             Radiology:  < from: Xray Chest 2 Views PA/Lat (12.20.21 @ 19:30) >  Impression:  Upper pneumomediastinum is noted, which may be related to patient's known   asthma.          Assessment: 21y M w/ pmh moderate severe asthma p/w 3 days of cough and chest/throat pain, admitted for treatment of asthma and monitoring of pneumomediastinum, RE+. Patient's asthma exacerbation may have been due to viral infection, exposure to cats, or both, and lung trapping may have caused spontaneous pneumomediastinum. Patient appears clinically well at this time, with wheezing but adequate air entry, able to talk in full sentences, so will treat minor appearing pneumomediastinum with treatment of asthma, avoid valsalva/incentive spirometry.      Plan:   Resp   - room air  - Albuterol MDI w/ spacer 2 puff q2h  - Flovent 2 puff BID   - Singulair 10 mg qHs  - Prednisone 1mg/kg PO q12h  - Repeat CXR in AM     FENGI  - regular diet  - IV lock     ID  - RE +  - Tylenol/Motrin PRN for fever/pain

## 2021-12-20 NOTE — ED PROVIDER NOTE - CLINICAL SUMMARY MEDICAL DECISION MAKING FREE TEXT BOX
22 yo M presented to ED for asthma exacerbation triggered by enterorhinovirus. pt improved after steroids and magnesium. labs WNL. dc home with father and strict return precautions.

## 2021-12-20 NOTE — ED ADULT NURSE NOTE - CHIEF COMPLAINT QUOTE
pt sts I have had sore throat for the past few days and on my left side of the throat it feels like my lymph nodes are swollen, I also have asthma and I am coughing and  I don't have an asthma pump

## 2021-12-20 NOTE — ED ADULT NURSE NOTE - OBJECTIVE STATEMENT
Patient complaining of shortness of breath, chest tightness and "lump" in his chest xdays. Patient states he is currently on oral steroids without improvement.

## 2021-12-20 NOTE — ED PROVIDER NOTE - NS ED ROS FT
Constitutional: See HPI.  Pt eating and drinking normally  Eyes: No discharge, erythema, pain, vision changes.  ENMT: (+) CONGESTION  Cardiac: No hx of known congenital defects. No CP  Respiratory: (+) INTERMITTENT COUGH (+) MODERATE RESPIRATORY DISTRESS  GI: No nausea, vomiting, diarrhea or pain  MS: No muscle weakness, myalgia, joint pain, back pain  Neuro: No headache or weakness. No LOC.  Skin: No skin rash.

## 2021-12-20 NOTE — ED PROVIDER NOTE - PHYSICAL EXAMINATION
Vital Signs: I have reviewed the initial vital signs.  Constitutional: well-nourished, appears stated age, no acute distress  HEENT: NCAT, moist mucous membranes, normal TMs  Cardiovascular: regular rate, regular rhythm, well-perfused extremities  Respiratory: unlabored respiratory effort, (+) MILD WHEEZES, NO CREPITUS, speaking full sentences, saturation 100% on RA.  Gastrointestinal: soft, non-tender abdomen,  Musculoskeletal: supple neck, no gross deformities  Integumentary: warm, dry, no rash  Neurologic: awake, alert, normal tone, moving all extremities

## 2021-12-20 NOTE — ED PROVIDER NOTE - PHYSICAL EXAMINATION
Vital Signs: I have reviewed the initial vital signs.  Constitutional: well-nourished, appears stated age, no acute distress  HEENT: NCAT, moist mucous membranes, midline uvula with no pharyngeal exudates  Cardiovascular: regular rate, regular rhythm, no murmurs  Respiratory: (+) MODERATE RESPIRATORY DISTRESS, (+) INSPIRATORY AND EXPIRATORY WHEEZES BILATERALLY  Gastrointestinal: soft, non-tender abdomen  Musculoskeletal: supple neck, no gross deformities  Integumentary: warm, dry, no rash  Neurologic: awake, alert, normal tone, moving all extremities

## 2021-12-20 NOTE — ED PROVIDER NOTE - OBJECTIVE STATEMENT
Patient is a 21 y M with pmhx of Asthma called back after CXR revealed pneumomediastinum. Patient says his breathing is much better and he is able to speak in full sentences and walk, but still has some discomfort with deep breaths and cough. Patient denies fever, chills, nausea, or vomiting.

## 2021-12-20 NOTE — H&P PEDIATRIC - NSICDXFAMILYHX_GEN_ALL_CORE_FT
FAMILY HISTORY:  Father  Still living? Unknown  Family history of asthma in father, Age at diagnosis: Age Unknown

## 2021-12-20 NOTE — ED PROVIDER NOTE - PROGRESS NOTE DETAILS
ML: Pt improved. Peak flow increased to 350 MM: Patient sleeping comfortably in bed, NAD, afebrile, hemodynamically stable. Any available tests and studies were discussed with patient and family. Discharged with instructions in further symptomatic care, return precautions, and need for PMD f/u.

## 2021-12-20 NOTE — ED PROVIDER NOTE - PROGRESS NOTE DETAILS
MM: Patient signed out from Dr. Vogel. Resting comfortably. ED Attending DONNA Westbrook  pt aware of all labs and imaging, understands repeat cxr, satruation 100 on ra, aware of plan for admission and agrees, pediatric team aware of pt and admission.

## 2021-12-20 NOTE — ED PROVIDER NOTE - ATTENDING CONTRIBUTION TO CARE
20 yo M with PMH of asthma (requiring hospitalizations but no intubations) presents to ED for SOB and chest tightness for the past few days. PT went to  and received breathing treatments and steroids. Pt took steroid dose this afternoon. However, he still feels SOB. Associated with cough, no fever. No nausea, vomiting, abdominal pain.     Const: Well nourished, well developed, appears stated age. unable to speak in full sentences due to SOB  Eyes: PERRL, no conjunctival injection  HENT:  Neck supple without meningismus   CV: RRR, Warm, well-perfused extremities  RESP: inspiratory and expiratory wheezes , + tachypnea   GI: soft, non-tender, non-distended  MSK: No gross deformities appreciated  Skin: Warm, dry. No rashes  Neuro: Alert, CNs II-XII grossly intact. Sensation and motor function of extremities grossly intact.  Psych: Appropriate mood and affect.    asthma exacerbation- breathing treatments RVP 0

## 2021-12-20 NOTE — H&P PEDIATRIC - HISTORY OF PRESENT ILLNESS
RACHEL STATON    HPI:    PMH:   PSH:   Meds:   Allergies: NKDA   FH:   SH:   HEADSS:  - Home:   - Education/Employment:  - Activities:  - Drugs:  - Sexuality:  - Suicide/Depression:  Birth: FT, , no NICU stay, no complications  Development: developmentally appropriate, rising ___ grader, academically performing well. ST/OT/PT  Vaccines:   PMD:     ED Course:    Review of Systems  Constitutional: (-) fever (-) weakness (-) diaphoresis (-) pain  Eyes: (-) change in vision (-) photophobia (-) eye pain  ENT: (-) sore throat (-) ear pain  (-) nasal discharge (-) congestion  Cardiovascular: (-) chest pain (-) palpitations  Respiratory: (-) SOB (-) cough (-) WOB (-) wheeze (-) tightness  GI: (-) abdominal pain (-) nausea (-) vomiting (-) diarrhea (-) constipation  : (-) dysuria (-) hematuria (-) increased frequency (-) increased urgency  Integumentary: (-) rash (-) redness (-) joint pain (-) MSK pain (-) swelling  Neurological:  (-) focal deficit (-) altered mental status (-) dizziness (-) headache  General: (-) recent travel (-) sick contacts (-) decreased PO (-) urine output     Vital Signs Last 24 Hrs  T(C): 37.5 (20 Dec 2021 18:15), Max: 37.5 (20 Dec 2021 18:15)  T(F): 99.5 (20 Dec 2021 18:15), Max: 99.5 (20 Dec 2021 18:15)  HR: 93 (20 Dec 2021 18:15) (92 - 93)  BP: 156/77 (20 Dec 2021 18:15) (127/70 - 156/77)  BP(mean): --  RR: 18 (20 Dec 2021 18:15) (18 - 18)  SpO2: 100% (20 Dec 2021 18:15) (100% - 100%)    I&O's Summary      Drug Dosing Weight    Weight (kg): 68 (20 Dec 2021 05:27)    Physical Exam:  GENERAL: well-appearing, well nourished, no acute distress, AOx3  HEENT: NCAT, conjunctiva clear and not injected, sclera non-icteric, PERRLA, EACs clear, TMs nonbulging/nonerythematous, nares patent, mucous membranes moist, no mucosal lesions, pharynx nonerythematous, no tonsillar hypertrophy or exudate, neck supple, no cervical lymphadenopathy  HEART: RRR, S1, S2, no rubs, murmurs, or gallops, RP/DP present, cap refill <2 seconds  LUNG: CTAB, no wheezing, no ronchi, no crackles, no retractions, no belly breathing, no tachypnea  ABDOMEN: +BS, soft, nontender, nondistended, no hepatomegaly, no splenomegaly, no hernia  NEURO/MSK: grossly intact  NEURO: CNII-XII grossly intact, EOMI, no dysmetria, DTRs normal b/l, no ataxia, sensation intact to light touch, negative Babinski  MUSCULOSKELETAL: passive and active ROM intact, 5/5 strength upper and lower extremities  SKIN: good turgor, no rash, no bruising or prominent lesions  BACK: spine normal without deformity or tenderness, no CVA tenderness  RECTAL: normal sphincter tone, no hemorrhoids or masses palpable  EXTREMITIES: No amputations or deformities, cyanosis, edema or varicosities, peripheral pulses intact  PSYCHIATRIC: Oriented X3, intact recent and remote memory, judgment and insight, normal mood and affect  FEMALE : Vagina without lesions or discharge. Cervix without lesions or discharge. Uterus and adnexa/parametria nontender without masses  BREAST: No nipple abnormality, dominant masses, tenderness to palpation, axillary or supraclavicular adenopathy  MALE : Penis circumcised without lesions, urethral meatus normal location without discharge, testes and epididymides normal size without masses, scrotum without lesions, cremasteric reflex present b/l    Medications:  MEDICATIONS  (STANDING):  ALBUTerol  90 MICROgram(s) HFA Inhaler - Peds 2 Puff(s) Inhalation every 2 hours  albuterol/ipratropium for Nebulization.. 3 milliLiter(s) Nebulizer every 20 minutes  fluticasone propionate  110 MICROgram(s) HFA Inhaler - Peds 2 Puff(s) Inhalation two times a day  montelukast Oral Tab/Cap - Peds 10 milliGRAM(s) Oral at bedtime  predniSONE Oral Tab/Cap - Peds 40 milliGRAM(s) Oral every 24 hours    MEDICATIONS  (PRN):      Labs:  CBC Full  -  ( 20 Dec 2021 20:29 )  WBC Count : 15.20 K/uL  RBC Count : 5.48 M/uL  Hemoglobin : 16.4 g/dL  Hematocrit : 47.1 %  Platelet Count - Automated : 226 K/uL  Mean Cell Volume : 85.9 fL  Mean Cell Hemoglobin : 29.9 pg  Mean Cell Hemoglobin Concentration : 34.8 g/dL  Auto Neutrophil # : 12.00 K/uL  Auto Lymphocyte # : 1.38 K/uL  Auto Monocyte # : 1.32 K/uL  Auto Eosinophil # : 0.40 K/uL  Auto Basophil # : 0.05 K/uL  Auto Neutrophil % : 79.0 %  Auto Lymphocyte % : 9.1 %  Auto Monocyte % : 8.7 %  Auto Eosinophil % : 2.6 %  Auto Basophil % : 0.3 %      12    135  |  99  |  13  ----------------------------<  82  4.1   |  17  |  1.0    Ca    10.4<H>      20 Dec 2021 20:29  Mg     2.3     12-    TPro  8.6<H>  /  Alb  5.2  /  TBili  0.7  /  DBili  x   /  AST  19  /  ALT  16  /  AlkPhos  85  12-20    LIVER FUNCTIONS - ( 20 Dec 2021 20:29 )  Alb: 5.2 g/dL / Pro: 8.6 g/dL / ALK PHOS: 85 U/L / ALT: 16 U/L / AST: 19 U/L / GGT: x               Pending -    Radiology:     RACHEL STATON    HPI:    PMH:   PSH:   Meds:   Allergies: NKDA   FH:   SH:   HEADSS:  - Home: Lives at home with father and brother  - Education/Employment: Not enrolled/ Not employed  - Activities:   - Drugs:  - Sexuality:  - Suicide/Depression:  Birth: FT, , no NICU stay, no complications  Development: developmentally appropriate, rising ___ grader, academically performing well. ST/OT/PT  Vaccines: UTD, no flu, no COVID  PMD: Dr. Figueroa, currently looking for a new PMD    ED Course: CBCd, CMP, Mgx1, CXR, Albuterol x1, COVID/RVP    Vital Signs Last 24 Hrs  T(C): 37.5 (20 Dec 2021 18:15), Max: 37.5 (20 Dec 2021 18:15)  T(F): 99.5 (20 Dec 2021 18:15), Max: 99.5 (20 Dec 2021 18:15)  HR: 93 (20 Dec 2021 18:15) (92 - 93)  BP: 156/77 (20 Dec 2021 18:15) (127/70 - 156/77)  BP(mean): --  RR: 18 (20 Dec 2021 18:15) (18 - 18)  SpO2: 100% (20 Dec 2021 18:15) (100% - 100%)    I&O's Summary    Drug Dosing Weight  Weight (kg): 68 (20 Dec 2021 05:27)      Medications:  MEDICATIONS  (STANDING):  ALBUTerol  90 MICROgram(s) HFA Inhaler - Peds 2 Puff(s) Inhalation every 2 hours  albuterol/ipratropium for Nebulization.. 3 milliLiter(s) Nebulizer every 20 minutes  fluticasone propionate  110 MICROgram(s) HFA Inhaler - Peds 2 Puff(s) Inhalation two times a day  montelukast Oral Tab/Cap - Peds 10 milliGRAM(s) Oral at bedtime  predniSONE Oral Tab/Cap - Peds 40 milliGRAM(s) Oral every 24 hours    MEDICATIONS  (PRN):      Labs:  CBC Full  -  ( 20 Dec 2021 20:29 )  WBC Count : 15.20 K/uL  RBC Count : 5.48 M/uL  Hemoglobin : 16.4 g/dL  Hematocrit : 47.1 %  Platelet Count - Automated : 226 K/uL  Mean Cell Volume : 85.9 fL  Mean Cell Hemoglobin : 29.9 pg  Mean Cell Hemoglobin Concentration : 34.8 g/dL  Auto Neutrophil # : 12.00 K/uL  Auto Lymphocyte # : 1.38 K/uL  Auto Monocyte # : 1.32 K/uL  Auto Eosinophil # : 0.40 K/uL  Auto Basophil # : 0.05 K/uL  Auto Neutrophil % : 79.0 %  Auto Lymphocyte % : 9.1 %  Auto Monocyte % : 8.7 %  Auto Eosinophil % : 2.6 %  Auto Basophil % : 0.3 %          135  |  99  |  13  ----------------------------<  82  4.1   |  17  |  1.0    Ca    10.4<H>      20 Dec 2021 20:29  Mg     2.3         TPro  8.6<H>  /  Alb  5.2  /  TBili  0.7  /  DBili  x   /  AST  19  /  ALT  16  /  AlkPhos  85  12-    LIVER FUNCTIONS - ( 20 Dec 2021 20:29 )  Alb: 5.2 g/dL / Pro: 8.6 g/dL / ALK PHOS: 85 U/L / ALT: 16 U/L / AST: 19 U/L / GGT: x               Pending -    Radiology:     RACHEL STATON    HPI:    PMH: Asthma  PSH: None  Meds: Ipratropium, Albuterol, Steroids (3 days), Singulair  Allergies: Cats  FH: Father: Asthma  SH:   HEADSS:  - Home: Lives at home with father and brother  - Education/Employment: Not enrolled/ Not employed  - Activities: Car meet  - Drugs:  Smokes weed 3-4x/day, vapes (tobacco) on/off for the past month, denies other drug use, wax tank, drinks beer occaionally  - Sexuality: Sexually active with one female partner, does not use protection, refused to be tested for STDs or HIV  - Suicide/Depression: Denies SI or HI. Admits to having anxiety and was hospitalized a year ago.  Development: developmentally appropriate, studying for GED  Vaccines: UTD, no flu, no COVID  PMD: Dr. Figueroa, currently looking for a new PMD    ED Course: CBCd, CMP, Mgx1, CXR, Albuterol x1, COVID/RVP    Vital Signs Last 24 Hrs  T(C): 37.5 (20 Dec 2021 18:15), Max: 37.5 (20 Dec 2021 18:15)  T(F): 99.5 (20 Dec 2021 18:15), Max: 99.5 (20 Dec 2021 18:15)  HR: 93 (20 Dec 2021 18:15) (92 - 93)  BP: 156/77 (20 Dec 2021 18:15) (127/70 - 156/77)  BP(mean): --  RR: 18 (20 Dec 2021 18:15) (18 - 18)  SpO2: 100% (20 Dec 2021 18:15) (100% - 100%)    I&O's Summary    Drug Dosing Weight  Weight (kg): 68 (20 Dec 2021 05:27)      Medications:  MEDICATIONS  (STANDING):  ALBUTerol  90 MICROgram(s) HFA Inhaler - Peds 2 Puff(s) Inhalation every 2 hours  albuterol/ipratropium for Nebulization.. 3 milliLiter(s) Nebulizer every 20 minutes  fluticasone propionate  110 MICROgram(s) HFA Inhaler - Peds 2 Puff(s) Inhalation two times a day  montelukast Oral Tab/Cap - Peds 10 milliGRAM(s) Oral at bedtime  predniSONE Oral Tab/Cap - Peds 40 milliGRAM(s) Oral every 24 hours    MEDICATIONS  (PRN):      Labs:  CBC Full  -  ( 20 Dec 2021 20:29 )  WBC Count : 15.20 K/uL  RBC Count : 5.48 M/uL  Hemoglobin : 16.4 g/dL  Hematocrit : 47.1 %  Platelet Count - Automated : 226 K/uL  Mean Cell Volume : 85.9 fL  Mean Cell Hemoglobin : 29.9 pg  Mean Cell Hemoglobin Concentration : 34.8 g/dL  Auto Neutrophil # : 12.00 K/uL  Auto Lymphocyte # : 1.38 K/uL  Auto Monocyte # : 1.32 K/uL  Auto Eosinophil # : 0.40 K/uL  Auto Basophil # : 0.05 K/uL  Auto Neutrophil % : 79.0 %  Auto Lymphocyte % : 9.1 %  Auto Monocyte % : 8.7 %  Auto Eosinophil % : 2.6 %  Auto Basophil % : 0.3 %      12-20    135  |  99  |  13  ----------------------------<  82  4.1   |  17  |  1.0    Ca    10.4<H>      20 Dec 2021 20:29  Mg     2.3     12-20    TPro  8.6<H>  /  Alb  5.2  /  TBili  0.7  /  DBili  x   /  AST  19  /  ALT  16  /  AlkPhos  85  12-20    LIVER FUNCTIONS - ( 20 Dec 2021 20:29 )  Alb: 5.2 g/dL / Pro: 8.6 g/dL / ALK PHOS: 85 U/L / ALT: 16 U/L / AST: 19 U/L / GGT: x               Pending -    Radiology:     RACHEL STATON  HPI: 20 yo M with PMH of Asthma presents with 3 days of chest tightness, dysphagia and cough. He went to  2-3 weeks ago. That is the time he felt the chest knot. His symptoms persisted and got worse. Patient admits to using Albuterol but it did not help.  prescribed Ipratropium which also did not help, singulair every night and steroids for 3 days. He went to the ED day before admission and was home. He was called back because the CXR showed the pneumomediastinum. He admits to having chest pain which is centrally localized, non-reproducible, non-radiating and worse with inhalation. The neck pain initially started on the left side, then right and now centrally located. He is able to tolerated PO with some pain. Patient said three days ago he went to his girlfriend's house who has a cat and he is allergic to. In addition, his girlfriend had strept throat and just cleared it 4 days ago. He denies coughing at night when he is home but when he is at his girlfriend's home, he wakes up at night because of the cat. Patient had diarrhea that started 4 days ago, now resolved. Patient denies vomiting, rash, or fever. No recent travel. Patient has no other concerns.    PMH: Asthma  PSH: None  Meds: Ipratropium, Albuterol, Steroids (3 days), Singulair  Allergies: Cats  FH: Father: Asthma  SH:   HEADSS:  - Home: Lives at home with father and brother  - Education/Employment: Not enrolled/ Not employed  - Activities: Car meet  - Drugs:  Smokes weed 3-4x/day, vapes (tobacco) on/off for the past month, denies other drug use, wax tank, drinks beer occaionally  - Sexuality: Sexually active with one female partner, does not use protection, refused to be tested for STDs or HIV  - Suicide/Depression: Denies SI or HI. Admits to having anxiety and was hospitalized a year ago.  Development: developmentally appropriate, studying for GED  Vaccines: UTD, no flu, no COVID  PMD: Dr. Figueroa, currently looking for a new PMD    ED Course: CBCd, CMP, Mgx1, CXR, Albuterol x1, COVID/RVP    Vital Signs Last 24 Hrs  T(C): 37.5 (20 Dec 2021 18:15), Max: 37.5 (20 Dec 2021 18:15)  T(F): 99.5 (20 Dec 2021 18:15), Max: 99.5 (20 Dec 2021 18:15)  HR: 93 (20 Dec 2021 18:15) (92 - 93)  BP: 156/77 (20 Dec 2021 18:15) (127/70 - 156/77)  BP(mean): --  RR: 18 (20 Dec 2021 18:15) (18 - 18)  SpO2: 100% (20 Dec 2021 18:15) (100% - 100%)    I&O's Summary    Drug Dosing Weight  Weight (kg): 68 (20 Dec 2021 05:27)      Medications:  MEDICATIONS  (STANDING):  ALBUTerol  90 MICROgram(s) HFA Inhaler - Peds 2 Puff(s) Inhalation every 2 hours  albuterol/ipratropium for Nebulization.. 3 milliLiter(s) Nebulizer every 20 minutes  fluticasone propionate  110 MICROgram(s) HFA Inhaler - Peds 2 Puff(s) Inhalation two times a day  montelukast Oral Tab/Cap - Peds 10 milliGRAM(s) Oral at bedtime  predniSONE Oral Tab/Cap - Peds 40 milliGRAM(s) Oral every 24 hours    MEDICATIONS  (PRN):      Labs:  CBC Full  -  ( 20 Dec 2021 20:29 )  WBC Count : 15.20 K/uL  RBC Count : 5.48 M/uL  Hemoglobin : 16.4 g/dL  Hematocrit : 47.1 %  Platelet Count - Automated : 226 K/uL  Mean Cell Volume : 85.9 fL  Mean Cell Hemoglobin : 29.9 pg  Mean Cell Hemoglobin Concentration : 34.8 g/dL  Auto Neutrophil # : 12.00 K/uL  Auto Lymphocyte # : 1.38 K/uL  Auto Monocyte # : 1.32 K/uL  Auto Eosinophil # : 0.40 K/uL  Auto Basophil # : 0.05 K/uL  Auto Neutrophil % : 79.0 %  Auto Lymphocyte % : 9.1 %  Auto Monocyte % : 8.7 %  Auto Eosinophil % : 2.6 %  Auto Basophil % : 0.3 %      12-20    135  |  99  |  13  ----------------------------<  82  4.1   |  17  |  1.0    Ca    10.4<H>      20 Dec 2021 20:29  Mg     2.3     12-20    TPro  8.6<H>  /  Alb  5.2  /  TBili  0.7  /  DBili  x   /  AST  19  /  ALT  16  /  AlkPhos  85  12-20    LIVER FUNCTIONS - ( 20 Dec 2021 20:29 )  Alb: 5.2 g/dL / Pro: 8.6 g/dL / ALK PHOS: 85 U/L / ALT: 16 U/L / AST: 19 U/L / GGT: x               Pending -    Radiology:

## 2021-12-20 NOTE — ED PROVIDER NOTE - PLAN OF CARE
Plan: Labs, duo nebs, decadron, mg, repeat cxr, reassess. covid test from earlier negative for covid (+) enterovirus.,

## 2021-12-20 NOTE — ED PROVIDER NOTE - CARE PLAN
Assessment and plan of treatment:	Plan: Labs, duo nebs, decadron, mg, repeat cxr, reassess. covid test from earlier negative for covid (+) enterovirus.,   1 Principal Discharge DX:	Pneumomediastinum  Assessment and plan of treatment:	Plan: Labs, duo nebs, decadron, mg, repeat cxr, reassess. covid test from earlier negative for covid (+) enterovirus.,  Secondary Diagnosis:	Asthma

## 2021-12-21 ENCOUNTER — TRANSCRIPTION ENCOUNTER (OUTPATIENT)
Age: 21
End: 2021-12-21

## 2021-12-21 VITALS
TEMPERATURE: 96 F | SYSTOLIC BLOOD PRESSURE: 138 MMHG | OXYGEN SATURATION: 96 % | RESPIRATION RATE: 20 BRPM | HEART RATE: 82 BPM | DIASTOLIC BLOOD PRESSURE: 81 MMHG

## 2021-12-21 PROCEDURE — 99254 IP/OBS CNSLTJ NEW/EST MOD 60: CPT

## 2021-12-21 PROCEDURE — 71046 X-RAY EXAM CHEST 2 VIEWS: CPT | Mod: 26

## 2021-12-21 PROCEDURE — 99222 1ST HOSP IP/OBS MODERATE 55: CPT

## 2021-12-21 PROCEDURE — 99238 HOSP IP/OBS DSCHRG MGMT 30/<: CPT

## 2021-12-21 RX ORDER — MONTELUKAST 4 MG/1
1 TABLET, CHEWABLE ORAL
Qty: 30 | Refills: 0
Start: 2021-12-21 | End: 2022-01-19

## 2021-12-21 RX ORDER — ALBUTEROL 90 UG/1
2 AEROSOL, METERED ORAL EVERY 4 HOURS
Refills: 0 | Status: DISCONTINUED | OUTPATIENT
Start: 2021-12-21 | End: 2021-12-21

## 2021-12-21 RX ORDER — ALBUTEROL 90 UG/1
2 AEROSOL, METERED ORAL
Qty: 18 | Refills: 0
Start: 2021-12-21 | End: 2022-01-04

## 2021-12-21 RX ORDER — FLUTICASONE PROPIONATE 220 MCG
1 AEROSOL WITH ADAPTER (GRAM) INHALATION
Qty: 1 | Refills: 0
Start: 2021-12-21 | End: 2022-01-19

## 2021-12-21 RX ORDER — ALBUTEROL 90 UG/1
2 AEROSOL, METERED ORAL
Refills: 0 | Status: DISCONTINUED | OUTPATIENT
Start: 2021-12-21 | End: 2021-12-21

## 2021-12-21 RX ADMIN — ALBUTEROL 2 PUFF(S): 90 AEROSOL, METERED ORAL at 11:58

## 2021-12-21 RX ADMIN — ALBUTEROL 2 PUFF(S): 90 AEROSOL, METERED ORAL at 02:00

## 2021-12-21 RX ADMIN — ALBUTEROL 2 PUFF(S): 90 AEROSOL, METERED ORAL at 05:07

## 2021-12-21 RX ADMIN — Medication 40 MILLIGRAM(S): at 10:17

## 2021-12-21 NOTE — DISCHARGE NOTE NURSING/CASE MANAGEMENT/SOCIAL WORK - NSDCPEFALRISK_GEN_ALL_CORE
For information on Fall & Injury Prevention, visit: [email or web address] OR  [email or web address] OR  https://www.cdc.gov/steadi/patient.html

## 2021-12-21 NOTE — DISCHARGE NOTE PROVIDER - CARE PROVIDER_API CALL
Francisco Parry)  Pediatric Pulmonary Medicine; Pediatrics  [facility], [address] [place] [address]  Phone: [phone]  Fax: [phone]  Follow Up Time: 2 weeks    Jovany Hodge)  Surgery; Thoracic and Cardiac Surgery  [address] [place] [address]  Phone: [phone]  Fax: [phone]  Follow Up Time: 1 week   Francisco Parry)  Pediatric Pulmonary Medicine; Pediatrics  [facility], [facility] [place] [address]  Phone: [phone]  Fax: [phone]  Scheduled Appointment: 12/29/2021 10:00 AM    Jovany Hodge)  Surgery; Thoracic and Cardiac Surgery  [address] [place] [address]  Phone: [phone]  Fax: [phone]  Follow Up Time: 1 week   Francisco Parry)  Pediatric Pulmonary Medicine; Pediatrics  [facility] at [facility], [address] [place] [address]  Phone: [phone]  Fax: [phone]  Scheduled Appointment: 12/29/2021 10:00 AM    Jovany Hodge)  Surgery; Thoracic and Cardiac Surgery  [address] [place] [address]  Phone: [phone]  Fax: [phone]  Follow Up Time: 1 week    Radiology,   [facility] [place] [address]  [phone]  Phone: (   )    -  Fax: (   )    -  Follow Up Time: 1 week

## 2021-12-21 NOTE — DISCHARGE NOTE PROVIDER - CARE PROVIDERS DIRECT ADDRESSES
,[email or web address],DirectAddress_Unknown ,[email or web address],DirectAddress_Unknown,DirectAddress_Unknown

## 2021-12-21 NOTE — DISCHARGE NOTE PROVIDER - PROVIDER TOKENS
PROVIDER:[TOKEN:[[id number]],FOLLOWUP:[2 weeks]],PROVIDER:[TOKEN:[[id number]],FOLLOWUP:[1 week]] PROVIDER:[TOKEN:[[id number]],SCHEDULEDAPPT:[12/29/2021],SCHEDULEDAPPTTIME:[10:00 AM]],PROVIDER:[TOKEN:[[id number]],FOLLOWUP:[1 week]] PROVIDER:[TOKEN:[[id number]],SCHEDULEDAPPT:[12/29/2021],SCHEDULEDAPPTTIME:[10:00 AM]],PROVIDER:[TOKEN:[[id number]],FOLLOWUP:[1 week]],FREE:[LAST:[Radiology],PHONE:[(   )    -],FAX:[(   )    -],ADDRESS:[[facility]  [address] [address] [phone]],FOLLOWUP:[1 week]]

## 2021-12-21 NOTE — CONSULT NOTE PEDS - ASSESSMENT
22yo M with hx moderate persistent asthma presenting with 3 days of URI symptoms and chest pain with deep breaths, admitted for management of pneumomediastinum in the context of URI secondary to rhino/enterovirus. Asthma regimen currently not optimized due to patient noncompliance and multiple exacerbating factors including smoking and allergen exposure. Patient will require lifestyle modifications and addition of controller medication.     Recommendations:  - Vit D level, NE allergy panel  - start Arnuity 200mcg BID  - start montelukast 10mg daily  - albuterol PRN (prior to exercise, at onset of URI sxs, prior to allergen exposure)  - asthma education while inpatient  - counseling on smoking cessation  - f/u pulm 2wks 22yo M with hx moderate persistent asthma presenting with 3 days of URI symptoms and chest pain with deep breaths, admitted for management of pneumomediastinum in the context of URI secondary to rhino/enterovirus. Asthma regimen currently not optimized due to patient noncompliance and multiple exacerbating factors including smoking and allergen exposure. Patient will require lifestyle modifications and addition of controller medication.   Possible allergic rhinitis, possible vit D deficiency, anxiety disorder    Recommendations:  -25OH  Vit D level, NE allergy panel by [facility]  - start Arnuity 200mcg one puff daily  - start montelukast 10mg daily  - albuterol PRN (prior to exercise, at onset of URI sxs, prior to allergen exposure)  - asthma education while inpatient  - counseling on smoking cessation  Suggested relaxation tapes for anxiety. He smokes marijuana to relive anxiety.  Obtained history, performed physical, edited note, discussed with patient and father.   - f/u pulm 2wks

## 2021-12-21 NOTE — DISCHARGE NOTE PROVIDER - NSDCCPCAREPLAN_GEN_ALL_CORE_FT
PRINCIPAL DISCHARGE DIAGNOSIS  Diagnosis: Pneumomediastinum  Assessment and Plan of Treatment:   Contact a health care provider if:  •You have a fever.  Get help right away if you have:  •Worsening pain in your chest, neck, jaw, or arms.  •Trouble breathing.  •New problems with speaking or swallowing.  Summary  •Pneumomediastinum is the presence of air in the mediastinum. This is the area of the body that is between the lungs and behind the breastbone. This can happen if air leaks out of your lungs, airways, or intestines and into your mediastinum.  •Symptoms may include chest, throat or jaw pain, increased pain when you move, swallow, or take a deep breath, changes in your voice, shortness of breath, and fever.  •Treatment depends on how severe your condition is and if there are complications.      SECONDARY DISCHARGE DIAGNOSES  Diagnosis: Asthma  Assessment and Plan of Treatment:      PRINCIPAL DISCHARGE DIAGNOSIS  Diagnosis: Pneumomediastinum  Assessment and Plan of Treatment: Discharge Plan:  - Follow up with Primary Care Doctor in 1-3 days. If you do not have a primary doctor, please follow up at the [facility].  - Follow up with Pulmonologist Dr. Parry in 2 weeks.  - Follow up with Thoracic surgeon Dr. Lior Puckett in 1 week. Please get Xray done 1 day prior to the appointment.  - Take medications as follows:   > Montelukast 10 mg, 1 pill every night.   > Arnuity 200 mcg, one puff every day.  > Albuterol 90 mcg, 2 puffs as needed; especially prior to exercise, at onset of upper respiratory infection symptoms, prior to allergen exposure.   Contact a health care provider if:  •You have a fever.  Get help right away if you have:  •Worsening pain in your chest, neck, jaw, or arms.  •Trouble breathing.  •New problems with speaking or swallowing.  Summary  •Pneumomediastinum is the presence of air in the mediastinum. This is the area of the body that is between the lungs and behind the breastbone. This can happen if air leaks out of your lungs, airways, or intestines and into your mediastinum.  •Symptoms may include chest, throat or jaw pain, increased pain when you move, swallow, or take a deep breath, changes in your voice, shortness of breath, and fever.  •Treatment depends on how severe your condition is and if there are complications.       PRINCIPAL DISCHARGE DIAGNOSIS  Diagnosis: Pneumomediastinum  Assessment and Plan of Treatment: Discharge Plan:  - Follow up with Primary Care Doctor in 1-3 days. If you do not have a primary doctor, please follow up at the [facility].  - Follow up with Pulmonologist Dr. Parry on 12/29/21 at 10 am.   - Follow up with Thoracic surgeon Dr. Lior Puckett in 1 week. Please get Xray done 1 day prior to the appointment.  - Take medications as follows:   > Montelukast 10 mg, 1 pill every night.   > Arnuity 200 mcg, one puff every day.  > Albuterol 90 mcg, 2 puffs as needed; especially prior to exercise, at onset of upper respiratory infection symptoms, prior to allergen exposure.   Contact a health care provider if:  •You have a fever.  Get help right away if you have:  •Worsening pain in your chest, neck, jaw, or arms.  •Trouble breathing.  •New problems with speaking or swallowing.  Summary  •Pneumomediastinum is the presence of air in the mediastinum. This is the area of the body that is between the lungs and behind the breastbone. This can happen if air leaks out of your lungs, airways, or intestines and into your mediastinum.  •Symptoms may include chest, throat or jaw pain, increased pain when you move, swallow, or take a deep breath, changes in your voice, shortness of breath, and fever.  •Treatment depends on how severe your condition is and if there are complications.       PRINCIPAL DISCHARGE DIAGNOSIS  Diagnosis: Pneumomediastinum  Assessment and Plan of Treatment: Discharge Plan:  - Follow up with Primary Care Doctor in 1-3 days. If you do not have a primary doctor, please follow up at the [facility].  - Follow up with Pulmonologist Dr. Parry on 12/29/21 at 10 am.   - Follow up with Thoracic surgeon Dr. Lior Puckett in 1 week. Please get Xray done 1 day prior to the appointment at the Radiology Department at [facility].  - Take medications as follows:   > Montelukast 10 mg, 1 pill every night.   > Arnuity 200 mcg, one puff every day.  > Albuterol 90 mcg, 2 puffs as needed; especially prior to exercise, at onset of upper respiratory infection symptoms, prior to allergen exposure.   Contact a health care provider if:  •You have a fever.  Get help right away if you have:  •Worsening pain in your chest, neck, jaw, or arms.  •Trouble breathing.  •New problems with speaking or swallowing.  Summary  •Pneumomediastinum is the presence of air in the mediastinum. This is the area of the body that is between the lungs and behind the breastbone. This can happen if air leaks out of your lungs, airways, or intestines and into your mediastinum.  •Symptoms may include chest, throat or jaw pain, increased pain when you move, swallow, or take a deep breath, changes in your voice, shortness of breath, and fever.  •Treatment depends on how severe your condition is and if there are complications.

## 2021-12-21 NOTE — PROGRESS NOTE PEDS - SUBJECTIVE AND OBJECTIVE BOX
Patient is a 21y old  Male who presents with a chief complaint of Pneumomediastinum (21 Dec 2021 00:09)      INTERVAL/OVERNIGHT EVENTS: Patient is sitting in bed comfortably. Patient tolerating PO solids and liquids. Reports that he is feeling better, however, still has a cough and reports significant congestion. Patient has mild throat pain. Patient is voiding and stooling adequately.    VITALS, INTAKE/OUTPUT:  Vital Signs Last 24 Hrs  T(C): 37 (21 Dec 2021 04:00), Max: 37.5 (20 Dec 2021 18:15)  T(F): 98.6 (21 Dec 2021 04:00), Max: 99.5 (20 Dec 2021 18:15)  HR: 85 (21 Dec 2021 04:00) (85 - 93)  BP: 137/82 (21 Dec 2021 04:00) (137/82 - 156/77)  BP(mean): --  RR: 23 (21 Dec 2021 04:00) (18 - 23)  SpO2: 95% (21 Dec 2021 04:00) (95% - 100%)  Daily     Daily Weight in Gm: 68481 (21 Dec 2021 04:00)  I&O's Summary    PHYSICAL EXAM:  Gen: No acute distress; interactive, well appearing  HEENT: No conjunctivitis or scleral icterus; no nasal discharge  Neck: Supple, no cervical lymphadenopathy  Chest: CTA b/l, no crackles/wheezes, no tachypnea or retractions  CV: RRR  Neuro: Grossly nonfocal  MSK: Strength 5/5    INTERVAL LAB RESULTS:                        16.4   15.20 )-----------( 226      ( 20 Dec 2021 20:29 )             47.1                         15.5   16.27 )-----------( 220      ( 20 Dec 2021 01:15 )             43.2                               135    |  99     |  13                  Calcium: 10.4  / iCa: x      (12-20 @ 20:29)    ----------------------------<  82        Magnesium: 2.3                              4.1     |  17     |  1.0              Phosphorous: x        TPro  8.6    /  Alb  5.2    /  TBili  0.7    /  DBili  x      /  AST  19     /  ALT  16     /  AlkPhos  85     20 Dec 2021 20:29    < from: Xray Chest 2 Views PA/Lat (12.20.21 @ 19:30) >  Impression:    Upper pneumomediastinum is noted, which may be related to patient's known   asthma.    --- End of Report ---    < end of copied text >    Medications and Allergies:  MEDICATIONS  (STANDING):  ALBUTerol  90 MICROgram(s) HFA Inhaler - Peds 2 Puff(s) Inhalation every 3 hours  albuterol/ipratropium for Nebulization.. 3 milliLiter(s) Nebulizer every 20 minutes  fluticasone propionate  110 MICROgram(s) HFA Inhaler - Peds 2 Puff(s) Inhalation two times a day  montelukast Oral Tab/Cap - Peds 10 milliGRAM(s) Oral at bedtime  predniSONE Oral Tab/Cap - Peds 40 milliGRAM(s) Oral every 12 hours    MEDICATIONS  (PRN):  acetaminophen     Tablet .. 650 milliGRAM(s) Oral every 6 hours PRN Mild Pain (1 - 3)  ibuprofen  Oral Tab/Cap - Peds. 200 milliGRAM(s) Oral every 6 hours PRN Moderate Pain (4 - 6)    Allergies    No Known Allergies    Assessment: 20 yo M with PMHx asthma p/w chest tightness and neck pain for three days found to have pneumomediastinum on CXR and RE+ admitted for management and monitoring.     Plan:  RESP:  - RA  - Albuterol 90 mcg 2 puff q3h  - Singulair 10 mg QHS  - Flovent 110 mcg 2 puffs BID  - Prednisone 40 mg q12h (starts at 8 am)  - CXR (12/20): pneumomediastinum  > repeat CXR to follow progression  - Consult    CVS:  - CVS    FENGI:  - Soft, bite-sized Pediatric Diet    ID:  - RE+ (contact precautions)  - COVID negative (12/20)  - Tylenol 650 mg q6 PO PRN for pain  - Motrin 200 mg q6 PO PRN for pain Patient is a 21y old  Male who presents with a chief complaint of Pneumomediastinum (21 Dec 2021 00:09)      INTERVAL/OVERNIGHT EVENTS: Patient is sitting in bed comfortably. Patient tolerating PO solids and liquids. Reports that he is feeling better, however, still has a cough and reports significant congestion. Patient has mild throat pain. Patient is voiding and stooling adequately.    VITALS, INTAKE/OUTPUT:  Vital Signs Last 24 Hrs  T(C): 37 (21 Dec 2021 04:00), Max: 37.5 (20 Dec 2021 18:15)  T(F): 98.6 (21 Dec 2021 04:00), Max: 99.5 (20 Dec 2021 18:15)  HR: 85 (21 Dec 2021 04:00) (85 - 93)  BP: 137/82 (21 Dec 2021 04:00) (137/82 - 156/77)  BP(mean): --  RR: 23 (21 Dec 2021 04:00) (18 - 23)  SpO2: 95% (21 Dec 2021 04:00) (95% - 100%)  Daily     Daily Weight in Gm: 55773 (21 Dec 2021 04:00)  I&O's Summary    PHYSICAL EXAM:  Gen: No acute distress; interactive, well appearing  HEENT: No conjunctivitis or scleral icterus; no nasal discharge  Neck: Supple, no cervical lymphadenopathy  Chest: CTA b/l, no crackles/wheezes, no tachypnea or retractions  CV: RRR  Neuro: Grossly nonfocal  MSK: Strength 5/5    INTERVAL LAB RESULTS:                        16.4   15.20 )-----------( 226      ( 20 Dec 2021 20:29 )             47.1                         15.5   16.27 )-----------( 220      ( 20 Dec 2021 01:15 )             43.2                               135    |  99     |  13                  Calcium: 10.4  / iCa: x      (12-20 @ 20:29)    ----------------------------<  82        Magnesium: 2.3                              4.1     |  17     |  1.0              Phosphorous: x        TPro  8.6    /  Alb  5.2    /  TBili  0.7    /  DBili  x      /  AST  19     /  ALT  16     /  AlkPhos  85     20 Dec 2021 20:29    < from: Xray Chest 2 Views PA/Lat (12.20.21 @ 19:30) >  Impression:    Upper pneumomediastinum is noted, which may be related to patient's known   asthma.    --- End of Report ---    < end of copied text >    Medications and Allergies:  MEDICATIONS  (STANDING):  ALBUTerol  90 MICROgram(s) HFA Inhaler - Peds 2 Puff(s) Inhalation every 3 hours  albuterol/ipratropium for Nebulization.. 3 milliLiter(s) Nebulizer every 20 minutes  fluticasone propionate  110 MICROgram(s) HFA Inhaler - Peds 2 Puff(s) Inhalation two times a day  montelukast Oral Tab/Cap - Peds 10 milliGRAM(s) Oral at bedtime  predniSONE Oral Tab/Cap - Peds 40 milliGRAM(s) Oral every 12 hours    MEDICATIONS  (PRN):  acetaminophen     Tablet .. 650 milliGRAM(s) Oral every 6 hours PRN Mild Pain (1 - 3)  ibuprofen  Oral Tab/Cap - Peds. 200 milliGRAM(s) Oral every 6 hours PRN Moderate Pain (4 - 6)    Allergies    No Known Allergies    Assessment: 22 yo M with PMHx asthma p/w chest tightness and neck pain for three days found to have pneumomediastinum on CXR and RE+ admitted for management and monitoring. Repeat imaging shows improving pneumomediastinum which aligns with patient's clinical status. Patient well appearing, w/out wheezing, chest pain and mild neck pain less severe than on presentation. Due to h/o poorly asthma, likely etiology of pneumomediastinum, plan to treat patient as asthma exacerbation. Will consult pulmonology for management recommendations.    Plan:  RESP:  - RA  - Albuterol 90 mcg 2 puff q3h  - Singulair 10 mg QHS  - Flovent 110 mcg 2 puffs BID  - Prednisone 40 mg q12h (starts at 8 am)  - CXR (12/20): pneumomediastinum  > repeat CXR to follow progression  - Consult    CVS:  - CVS    FENGI:  - Soft, bite-sized Pediatric Diet    ID:  - RE+ (contact precautions)  - COVID negative (12/20)  - Tylenol 650 mg q6 PO PRN for pain  - Motrin 200 mg q6 PO PRN for pain Patient is a 21y old  Male who presents with a chief complaint of Pneumomediastinum (21 Dec 2021 00:09)      INTERVAL/OVERNIGHT EVENTS: Patient is sitting in bed comfortably. Patient tolerating PO solids and liquids. Reports that he is feeling better, however, still has a cough and reports significant congestion. Patient has mild throat pain. Patient is voiding and stooling adequately.    VITALS, INTAKE/OUTPUT:  Vital Signs Last 24 Hrs  T(C): 37 (21 Dec 2021 04:00), Max: 37.5 (20 Dec 2021 18:15)  T(F): 98.6 (21 Dec 2021 04:00), Max: 99.5 (20 Dec 2021 18:15)  HR: 85 (21 Dec 2021 04:00) (85 - 93)  BP: 137/82 (21 Dec 2021 04:00) (137/82 - 156/77)  BP(mean): --  RR: 23 (21 Dec 2021 04:00) (18 - 23)  SpO2: 95% (21 Dec 2021 04:00) (95% - 100%)  Daily     Daily Weight in Gm: 63446 (21 Dec 2021 04:00)  I&O's Summary    PHYSICAL EXAM:  Gen: No acute distress; interactive, well appearing  HEENT: No conjunctivitis or scleral icterus; no nasal discharge  Neck: Supple, no cervical lymphadenopathy  Chest: CTA b/l, no crackles/wheezes, no tachypnea or retractions  CV: RRR  Neuro: Grossly nonfocal  MSK: Strength 5/5    INTERVAL LAB RESULTS:                        16.4   15.20 )-----------( 226      ( 20 Dec 2021 20:29 )             47.1                         15.5   16.27 )-----------( 220      ( 20 Dec 2021 01:15 )             43.2                               135    |  99     |  13                  Calcium: 10.4  / iCa: x      (12-20 @ 20:29)    ----------------------------<  82        Magnesium: 2.3                              4.1     |  17     |  1.0              Phosphorous: x        TPro  8.6    /  Alb  5.2    /  TBili  0.7    /  DBili  x      /  AST  19     /  ALT  16     /  AlkPhos  85     20 Dec 2021 20:29    < from: Xray Chest 2 Views PA/Lat (12.20.21 @ 19:30) >  Impression:    Upper pneumomediastinum is noted, which may be related to patient's known   asthma.    --- End of Report ---    < end of copied text >    Medications and Allergies:  MEDICATIONS  (STANDING):  ALBUTerol  90 MICROgram(s) HFA Inhaler - Peds 2 Puff(s) Inhalation every 3 hours  albuterol/ipratropium for Nebulization.. 3 milliLiter(s) Nebulizer every 20 minutes  fluticasone propionate  110 MICROgram(s) HFA Inhaler - Peds 2 Puff(s) Inhalation two times a day  montelukast Oral Tab/Cap - Peds 10 milliGRAM(s) Oral at bedtime  predniSONE Oral Tab/Cap - Peds 40 milliGRAM(s) Oral every 12 hours    MEDICATIONS  (PRN):  acetaminophen     Tablet .. 650 milliGRAM(s) Oral every 6 hours PRN Mild Pain (1 - 3)  ibuprofen  Oral Tab/Cap - Peds. 200 milliGRAM(s) Oral every 6 hours PRN Moderate Pain (4 - 6)    Allergies    No Known Allergies    Assessment: 22 yo M with PMHx asthma p/w chest tightness and neck pain for three days found to have pneumomediastinum on CXR and RE+ admitted for management and monitoring. Repeat imaging shows improving pneumomediastinum which aligns with patient's clinical status. Patient well appearing, w/out wheezing, chest pain and mild neck pain less severe than on presentation. Due to h/o poorly asthma, likely etiology of pneumomediastinum, plan to treat patient as asthma exacerbation. Will consult pulmonology for management recommendations. Consult general surgery for pneumomediastinum management.    Plan:  RESP:  - RA  - Albuterol 90 mcg 2 puff q3h  - Singulair 10 mg QHS  - Flovent 110 mcg 2 puffs BID  - Prednisone 40 mg q12h (starts at 8 am)  - CXR (12/20): pneumomediastinum  > repeat CXR to follow progression  - Consult  - Consult surgery    CVS:  - CVS    FENGI:  - Soft, bite-sized Pediatric Diet    ID:  - RE+ (contact precautions)  - COVID negative (12/20)  - Tylenol 650 mg q6 PO PRN for pain  - Motrin 200 mg q6 PO PRN for pain

## 2021-12-21 NOTE — CONSULT NOTE ADULT - ASSESSMENT
ASSESSMENT:  21yM w/ PMHx of  Severe asthma  who presented with Asthma exacerbation and pneumomediastinum. Physical exam findings, imaging, and labs as documented above.     PLAN:  - no acute surgical intervention  - patient can follow up outpatient with Dr. Mcdaniels with CXR prior to appointment  - please do not hesitate to contact Thoracic Surgery [phone] with questions/concerns/change in clinical status    Lines/Tubes: PIV    Above plan discussed with Attending Surgeon Dr. Mcdaniels  , patient, and Primary team  12-21-21 @ 16:40

## 2021-12-21 NOTE — DISCHARGE NOTE NURSING/CASE MANAGEMENT/SOCIAL WORK - PATIENT PORTAL LINK FT
You can access the FollowMyHealth Patient Portal offered by [facility] by registering at the following website: [email or web address]. By joining [facility]’s FollowMyHealth portal, you will also be able to view your health information using other applications (apps) compatible with our system.

## 2021-12-21 NOTE — DISCHARGE NOTE PROVIDER - NSDCMRMEDTOKEN_GEN_ALL_CORE_FT
Albuterol (Eqv-Proventil HFA) 90 mcg/inh inhalation aerosol: 2 puff(s) inhaled every 4 hours   albuterol 2.5 mg/3 mL (0.083%) inhalation solution: 3 milliliter(s) by nebulizer every 6 hours, As Needed -for bronchospasm   albuterol 2.5 mg/3 mL (0.083%) inhalation solution: 3 milliliter(s) by nebulizer every 6 hours    Albuterol (Eqv-Proventil HFA) 90 mcg/inh inhalation aerosol: 2 puff(s) inhaled every 4 hours, As Needed - for shortness of breath and/or wheezing   Arnuity Ellipta 200 mcg inhalation powder: 1 puff(s) inhaled once a day   montelukast 10 mg oral tablet: 1 tab(s) orally once a day (at bedtime)

## 2021-12-21 NOTE — DISCHARGE NOTE PROVIDER - YES NO FOR MLM POSITIVE OR NEGATIVE COVID RESULT
[FreeTextEntry1] : Patient was up-to-date with Pap smear and breast imaging studies.  She never have a screening colonoscopy, and was reminded to consult with GI.  She was also reminded to have routine eye exam, dental care and skin exam with dermatologist.
,

## 2021-12-21 NOTE — DISCHARGE NOTE PROVIDER - NSFOLLOWUPCLINICS_GEN_ALL_ED_FT
[facility]  Medicine  [address], [place]   Phone: [phone]  Fax:   Follow Up Time: 1-3 days

## 2021-12-21 NOTE — CONSULT NOTE ADULT - SUBJECTIVE AND OBJECTIVE BOX
GENERAL SURGERY CONSULT NOTE    Patient: RACHEL STATON , 21y ([birth year])Male   MRN: [id number]  Location: [address]  Visit: 12-20-21 Inpatient  Date: 12-21-21 @ 16:40    HPI:  RACHEL STATON  HPI: 22 yo M with PMH of Asthma presents with 3 days of chest tightness, dysphagia and cough. He went to  2-3 weeks ago. That is the time he felt the chest knot. His symptoms persisted and got worse. Patient admits to using Albuterol but it did not help.  prescribed Ipratropium which also did not help, singulair every night and steroids for 3 days. He went to the ED day before admission and was home. He was called back because the CXR showed the pneumomediastinum. He admits to having chest pain which is centrally localized, non-reproducible, non-radiating and worse with inhalation. The neck pain initially started on the left side, then right and now centrally located. He is able to tolerated PO with some pain. Patient said three days ago he went to his girlfriend's house who has a cat and he is allergic to. In addition, his girlfriend had strept throat and just cleared it 4 days ago. He denies coughing at night when he is home but when he is at his girlfriend's home, he wakes up at night because of the cat. Patient had diarrhea that started 4 days ago, now resolved. Patient denies vomiting, rash, or fever. No recent travel. Patient has no other concerns.    PMH: Asthma  PSH: None  Meds: Ipratropium, Albuterol, Steroids (3 days), Singulair  Allergies: Cats  FH: Father: Asthma  SH:   HEADSS:  - Home: Lives at home with father and brother  - Education/Employment: Not enrolled/ Not employed  - Activities: Car meet  - Drugs:  Smokes weed 3-4x/day, vapes (tobacco) on/off for the past month, denies other drug use, wax tank, drinks beer occaionally  - Sexuality: Sexually active with one female partner, does not use protection, refused to be tested for STDs or HIV  - Suicide/Depression: Denies SI or HI. Admits to having anxiety and was hospitalized a year ago.  Development: developmentally appropriate, studying for GED  Vaccines: UTD, no flu, no COVID  PMD: Dr. Figueroa, currently looking for a new PMD    ED Course: CBCd, CMP, Mgx1, CXR, Albuterol x1, COVID/RVP    Patient was found to have pneumomediastinum on CXR, Thoracic Surgery consulted.   Today, patient states his breathing is improved. He is not short of breath or experiencing chest pain. CXR appears improved this morning from prior in regards to pneumomediastinum.    Vital Signs Last 24 Hrs  T(C): 37.5 (20 Dec 2021 18:15), Max: 37.5 (20 Dec 2021 18:15)  T(F): 99.5 (20 Dec 2021 18:15), Max: 99.5 (20 Dec 2021 18:15)  HR: 93 (20 Dec 2021 18:15) (92 - 93)  BP: 156/77 (20 Dec 2021 18:15) (127/70 - 156/77)  BP(mean): --  RR: 18 (20 Dec 2021 18:15) (18 - 18)  SpO2: 100% (20 Dec 2021 18:15) (100% - 100%)    I&O's Summary    Drug Dosing Weight  Weight (kg): 68 (20 Dec 2021 05:27)      Medications:  MEDICATIONS  (STANDING):  ALBUTerol  90 MICROgram(s) HFA Inhaler - Peds 2 Puff(s) Inhalation every 2 hours  albuterol/ipratropium for Nebulization.. 3 milliLiter(s) Nebulizer every 20 minutes  fluticasone propionate  110 MICROgram(s) HFA Inhaler - Peds 2 Puff(s) Inhalation two times a day  montelukast Oral Tab/Cap - Peds 10 milliGRAM(s) Oral at bedtime  predniSONE Oral Tab/Cap - Peds 40 milliGRAM(s) Oral every 24 hours    MEDICATIONS  (PRN):      Labs:  CBC Full  -  ( 20 Dec 2021 20:29 )  WBC Count : 15.20 K/uL  RBC Count : 5.48 M/uL  Hemoglobin : 16.4 g/dL  Hematocrit : 47.1 %  Platelet Count - Automated : 226 K/uL  Mean Cell Volume : 85.9 fL  Mean Cell Hemoglobin : 29.9 pg  Mean Cell Hemoglobin Concentration : 34.8 g/dL  Auto Neutrophil # : 12.00 K/uL  Auto Lymphocyte # : 1.38 K/uL  Auto Monocyte # : 1.32 K/uL  Auto Eosinophil # : 0.40 K/uL  Auto Basophil # : 0.05 K/uL  Auto Neutrophil % : 79.0 %  Auto Lymphocyte % : 9.1 %  Auto Monocyte % : 8.7 %  Auto Eosinophil % : 2.6 %  Auto Basophil % : 0.3 %      12-20    135  |  99  |  13  ----------------------------<  82  4.1   |  17  |  1.0    Ca    10.4<H>      20 Dec 2021 20:29  Mg     2.3     12-20    TPro  8.6<H>  /  Alb  5.2  /  TBili  0.7  /  DBili  x   /  AST  19  /  ALT  16  /  AlkPhos  85  12-20    LIVER FUNCTIONS - ( 20 Dec 2021 20:29 )  Alb: 5.2 g/dL / Pro: 8.6 g/dL / ALK PHOS: 85 U/L / ALT: 16 U/L / AST: 19 U/L / GGT: x               Pending -    Radiology:     (20 Dec 2021 22:35)      PAST MEDICAL & SURGICAL HISTORY:  Asthma    No significant past surgical history        Home Medications:        VITALS:  T(F): 96.4 (12-21-21 @ 16:19), Max: 99.5 (12-20-21 @ 18:15)  HR: 82 (12-21-21 @ 16:19) (82 - 93)  BP: 138/81 (12-21-21 @ 16:19) (128/78 - 156/77)  RR: 20 (12-21-21 @ 16:19) (18 - 23)  SpO2: 96% (12-21-21 @ 16:19) (95% - 100%)    PHYSICAL EXAM:  General: NAD, AAOx3, calm and cooperative  HEENT: NCAT, TAL, EOMI, Trachea ML, Neck supple  Cardiac: RRR S1, S2, no Murmurs, rubs or gallops  Respiratory: CTAB, normal respiratory effort, breath sounds equal BL, no wheeze, rhonchi or crackles  Abdomen: Soft, non-distended, non-tender, no rebound, no guarding. +BS.  Musculoskeletal: Strength 5/5 BL UE/LE, ROM intact, compartments soft  Neuro: Sensation grossly intact and equal throughout, no focal deficits  Vascular: Pulses 2+ throughout, extremities well perfused  Skin: Warm/dry, normal color, no jaundice    MEDICATIONS  (STANDING):  ALBUTerol  90 MICROgram(s) HFA Inhaler - Peds 2 Puff(s) Inhalation every 4 hours  albuterol/ipratropium for Nebulization.. 3 milliLiter(s) Nebulizer every 20 minutes  montelukast Oral Tab/Cap - Peds 10 milliGRAM(s) Oral at bedtime    MEDICATIONS  (PRN):  acetaminophen     Tablet .. 650 milliGRAM(s) Oral every 6 hours PRN Mild Pain (1 - 3)  ibuprofen  Oral Tab/Cap - Peds. 200 milliGRAM(s) Oral every 6 hours PRN Moderate Pain (4 - 6)      LAB/STUDIES:                        16.4   15.20 )-----------( 226      ( 20 Dec 2021 20:29 )             47.1     12-20    135  |  99  |  13  ----------------------------<  82  4.1   |  17  |  1.0    Ca    10.4<H>      20 Dec 2021 20:29  Mg     2.3     12-20    TPro  8.6<H>  /  Alb  5.2  /  TBili  0.7  /  DBili  x   /  AST  19  /  ALT  16  /  AlkPhos  85  12-20      LIVER FUNCTIONS - ( 20 Dec 2021 20:29 )  Alb: 5.2 g/dL / Pro: 8.6 g/dL / ALK PHOS: 85 U/L / ALT: 16 U/L / AST: 19 U/L / GGT: x                         IMAGING:  < from: Xray Chest 2 Views PA/Lat (12.21.21 @ 10:29) >  Cardiac/mediastinum/hilum: Decreasing pneumomediastinum.    Lung parenchyma/Pleura: The lungs are hyperinflated. No focal   consolidation, effusion or pneumothorax.    Skeleton/soft tissues: Unremarkable.    Impression:    Decreasing pneumomediastinum.    < end of copied text >      ACCESS DEVICES:  [x ] Peripheral IV  [ ] Central Venous Line	[ ] R	[ ] L	[ ] IJ	[ ] Fem	[ ] SC	Placed:   [ ] Arterial Line		[ ] R	[ ] L	[ ] Fem	[ ] Rad	[ ] Ax	Placed:   [ ] PICC:					[ ] Mediport  [ ] Urinary Catheter, Date Placed:

## 2021-12-21 NOTE — CONSULT NOTE PEDS - SUBJECTIVE AND OBJECTIVE BOX
20yo M with hx moderate persistent asthma presenting with 3 days of URI symptoms and chest pain with deep breaths, admitted for management of pneumomediastinum in the context of URI secondary to rhino/enterovirus.    Patient was born full-term, no complications or NICU stay. He was well until age 11 when he was admitted for first-time asthma exacerbation. At that time was started on albuterol PRN. At age 14 had ED visit for asthma exacerbation, no admission. Went to ED for anxiety 1y ago and has since been able to control it at home. Uses albuterol inhaler 2-3 times per week. 3 weeks ago was started on daily ipratropium inhaler which he uses inconsistently. Has cough and SOB with exercise, uses albuterol after activity. Smokes marijuana 2-3 times daily to help with anxiety. Previously vaped daily, quit about 3 weeks ago. Lives at home with father and brother who also smoke. Frequently stays at girlfriend's house; she also smokes marijuana and has cats in the home which pt is allergic to; has increased coughing and SOB. Has dog at home but denies being allergic. Coughing at night 2x/week. Coughing in the morning 2x/week. Clearing throat in the morning. Has congestion primarily in the summer and spring. No eczema. Father also has asthma. No other pertinent family history.    Vital Signs Last 24 Hrs  T(C): 37.1 (21 Dec 2021 08:46), Max: 37.5 (20 Dec 2021 18:15)  T(F): 98.7 (21 Dec 2021 08:46), Max: 99.5 (20 Dec 2021 18:15)  HR: 85 (21 Dec 2021 08:46) (85 - 93)  BP: 133/81 (21 Dec 2021 08:46) (133/81 - 156/77)  BP(mean): --  RR: 22 (21 Dec 2021 08:46) (18 - 23)  SpO2: 98% (21 Dec 2021 08:46) (95% - 100%)    CONSTITUTIONAL: well-appearing, in NAD  SKIN: Warm dry, normal skin turgor  HEAD: NCAT  EYES: EOMI, PERRLA, no scleral icterus, conjunctiva pink  ENT: normal pharynx with no erythema or exudates  NECK: Supple; non tender. Full ROM.  CARD: RRR, no murmurs.  RESP: mild crackles and wheezing in RLL. no increased work of breathing  ABD: soft, non-tender, non-distended, no rebound or guarding.  EXT: Full ROM, no bony tenderness  PSYCH: Cooperative, appropriate.    Labs/Imagin  |  99  |  13  ----------------------------<  82  4.1   |  17  |  1.0    Ca    10.4<H>      20 Dec 2021 20:29  Mg     2.3     12-    TPro  8.6<H>  /  Alb  5.2  /  TBili  0.7  /  DBili  x   /  AST  19  /  ALT  16  /  AlkPhos  85  12-                          16.4   15.20 )-----------( 226      ( 20 Dec 2021 20:29 )             47.1       < from: Xray Chest 2 Views PA/Lat (12.21.21 @ 10:29) >  Impression:    Decreasing pneumomediastinum.    < end of copied text >   20yo M with hx moderate persistent asthma presenting with 3 days of URI symptoms and chest pain with deep breaths, admitted for management of pneumomediastinum in the context of URI secondary to rhino/enterovirus.    Patient was born full-term, no complications or NICU stay. He was well until age 11 when he was admitted for first-time asthma exacerbation. At that time was started on albuterol PRN. At age 14 had ED visit for asthma exacerbation, no admission. Went to ED for anxiety 1y ago and has since been able to control it at home. Uses albuterol inhaler 2-3 times per week, mostly after activity.  3 weeks ago was started  daily ipratropium inhaler which he uses inconsistently. Has cough and SOB with exercise, uses albuterol after activity. Smokes marijuana 2-3 times daily to help with anxiety. Previously vaped daily, quit about 3 weeks ago. Lives at home with father and brother who also smoke. Frequently stays at girlfriend's house; she also smokes marijuana and has cats in the home which pt is allergic to; has increased coughing and SOB on exposure to cats.  Has dog at home but denies being allergic. Coughing at night 2x/week. Coughing in the morning 2x/week. Clearing throat in the morning. Has congestion primarily in the winter and spring. No eczema.   Does not drink milk.   Sleep: Not snoring  BH: FTND  FH: Father with asthma. Mother with anxiety disorder.   Environmental Review: Lives with father and brother. They both smoke,. Often at girl friend's house. She smokes marijuana and has a cat.  He did not complete school. Planning on obtaining a GED.   Review of systems: General: Alert.  HEENT: Nasal congetion, sping, winter. Not snoring.  Respiratory: Coughing, sob, wheezing with activity  Cardiovascular: Negative  GI: Denies nausea, vomiting, pain abdomen  Dermatologic: No eczema  Orthopedic: Negative for injuries     Vital Signs Last 24 Hrs  T(C): 37.1 (21 Dec 2021 08:46), Max: 37.5 (20 Dec 2021 18:15)  T(F): 98.7 (21 Dec 2021 08:46), Max: 99.5 (20 Dec 2021 18:15)  HR: 85 (21 Dec 2021 08:46) (85 - 93)  BP: 133/81 (21 Dec 2021 08:46) (133/81 - 156/77)  BP(mean): --  RR: 22 (21 Dec 2021 08:46) (18 - 23)  SpO2: 98% (21 Dec 2021 08:46) (95% - 100%)        CONSTITUTIONAL: well-appearing, in NAD  SKIN: Warm dry, normal skin turgor  HEAD: NCAT  EYES: EOMI, PERRLA, no scleral icterus, conjunctiva pink  ENT: normal pharynx with no erythema or exudates  NECK: Supple; non tender. Full ROM.  CARD: RRR, no murmurs.  RESP: mild crackles and wheezing in R subscapular area. .No increased work of breathing  ABD: soft, non-tender, non-distended, no rebound or guarding.  EXT: Full ROM, no bony tenderness  PSYCH: Cooperative, appropriate.    Labs/Imagin  |  99  |  13  ----------------------------<  82  4.1   |  17  |  1.0    Ca    10.4<H>      20 Dec 2021 20:29  Mg     2.3     12-20    TPro  8.6<H>  /  Alb  5.2  /  TBili  0.7  /  DBili  x   /  AST  19  /  ALT  16  /  AlkPhos  85  12-20                          16.4   15.20 )-----------( 226      ( 20 Dec 2021 20:29 )             47.1       < from: Xray Chest 2 Views PA/Lat (.21.21 @ 10:29) >  Impression:    Decreasing pneumomediastinum.    < end of copied text >

## 2021-12-22 ENCOUNTER — NON-APPOINTMENT (OUTPATIENT)
Age: 21
End: 2021-12-22

## 2021-12-23 LAB
BERMUDA GRASS IGE QN: 0 — SIGNIFICANT CHANGE UP
BERMUDA GRASS IGE QN: <0.1 KUA/L — SIGNIFICANT CHANGE UP
BOXELDER/MAPLE CLASS: SIGNIFICANT CHANGE UP
CAT DANDER IGE QN: >100 KUA/L — HIGH
CMN PIGWEED IGE QN: <0.1 KUA/L — SIGNIFICANT CHANGE UP
COMMON RAGWEED IGE QN: <0.1 KUA/L — SIGNIFICANT CHANGE UP
COTTONWOOD IGE QN: <0.1 KUA/L — SIGNIFICANT CHANGE UP
DEPRECATED CAT DANDER IGE RAST QL: 6
DEPRECATED COMMON PIGWEED IGE RAST QL: 0 — SIGNIFICANT CHANGE UP
DEPRECATED COMMON RAGWEED IGE RAST QL: 0 — SIGNIFICANT CHANGE UP
DEPRECATED COTTONWOOD IGE RAST QL: 0 — SIGNIFICANT CHANGE UP
DEPRECATED ROACH IGE RAST QL: 0 — SIGNIFICANT CHANGE UP
DOG DANDER IGE QN: 30 KUA/L — HIGH
DOG DANDER IGE QN: 4
ROACH IGE QN: <0.1 KUA/L — SIGNIFICANT CHANGE UP
SILVER BIRCH IGE QN: 0 — SIGNIFICANT CHANGE UP
SILVER BIRCH IGE QN: <0.1 KUA/L — SIGNIFICANT CHANGE UP
TOTAL IGE SMQN RAST: 647 KU/L — HIGH
TREE ALLERG MIX8 IGE QL: 0.11 KUA/L — SIGNIFICANT CHANGE UP

## 2021-12-24 LAB
ALLERGEN - JUNIPER (RED CEDAR) CLASS: SIGNIFICANT CHANGE UP
ALLERGEN - JUNIPER (RED CEDAR) CONC: SIGNIFICANT CHANGE UP
ALLERGEN - ML SYCAMORE, LONDON PLANE: SIGNIFICANT CHANGE UP
ALLERGEN - MULBERRY: SIGNIFICANT CHANGE UP
CALIF WALNUT IGE QN: SIGNIFICANT CHANGE UP
CLADOSPORIUM IGE QN: SIGNIFICANT CHANGE UP
CLADOSPORIUM IGE QN: SIGNIFICANT CHANGE UP
D FARINAE IGE QN: SIGNIFICANT CHANGE UP
D PTERONYSS IGE QN: SIGNIFICANT CHANGE UP
DEPRECATED A ALTERNATA IGE RAST QL: SIGNIFICANT CHANGE UP
DEPRECATED A FUMIGATUS IGE RAST QL: SIGNIFICANT CHANGE UP
DEPRECATED ALTERNARIA IGE RAST QL: SIGNIFICANT CHANGE UP
DEPRECATED CALIF WALNUT POLN IGE RAST QL: SIGNIFICANT CHANGE UP
DEPRECATED D FARINAE IGE RAST QL: SIGNIFICANT CHANGE UP
DEPRECATED LONDON PLANE IGE RAST QL: SIGNIFICANT CHANGE UP
DEPRECATED MOUSE URINE PROT IGE RAST QL: SIGNIFICANT CHANGE UP
DEPRECATED MUGWORT IGE RAST QL: SIGNIFICANT CHANGE UP
DEPRECATED P NOTATUM IGE RAST QL: SIGNIFICANT CHANGE UP
DEPRECATED SHEEP SORREL IGE RAST QL: SIGNIFICANT CHANGE UP
DEPRECATED TIMOTHY IGE RAST QL: SIGNIFICANT CHANGE UP
DEPRECATED WHITE ASH IGE RAST QL: SIGNIFICANT CHANGE UP
DUST ALLERG MIX2 IGE RAST: SIGNIFICANT CHANGE UP
GOOSEFOOT IGE QN: SIGNIFICANT CHANGE UP
MOLD ALLERG MIX A IGE QL: SIGNIFICANT CHANGE UP
MOUSE URINE PROT IGE QN: SIGNIFICANT CHANGE UP
MUGWORT IGE QN: SIGNIFICANT CHANGE UP
MULBERRY CLASS: SIGNIFICANT CHANGE UP
P NOTATUM IGE QN: SIGNIFICANT CHANGE UP
SHEEP SORREL IGE QN: SIGNIFICANT CHANGE UP
TIMOTHY IGE QN: SIGNIFICANT CHANGE UP
TOTAL IGE SMQN RAST: SIGNIFICANT CHANGE UP
TREE ALLERG MIX1 IGE QL: SIGNIFICANT CHANGE UP
TREE ALLERG MIX1 IGE QN: SIGNIFICANT CHANGE UP
TREE ALLERG MIX1 IGE RAST: SIGNIFICANT CHANGE UP
TREE ALLERG MIX1 IGE RAST: SIGNIFICANT CHANGE UP
WEED ALLERG MIX1 IGE RAST: SIGNIFICANT CHANGE UP
WHITE ASH IGE QN: SIGNIFICANT CHANGE UP

## 2021-12-28 DIAGNOSIS — J30.81 ALLERGIC RHINITIS DUE TO ANIMAL (CAT) (DOG) HAIR AND DANDER: ICD-10-CM

## 2021-12-28 DIAGNOSIS — R06.02 SHORTNESS OF BREATH: ICD-10-CM

## 2021-12-28 DIAGNOSIS — J98.2 INTERSTITIAL EMPHYSEMA: ICD-10-CM

## 2021-12-28 DIAGNOSIS — J45.901 UNSPECIFIED ASTHMA WITH (ACUTE) EXACERBATION: ICD-10-CM

## 2021-12-28 DIAGNOSIS — F41.9 ANXIETY DISORDER, UNSPECIFIED: ICD-10-CM

## 2021-12-28 DIAGNOSIS — Z87.891 PERSONAL HISTORY OF NICOTINE DEPENDENCE: ICD-10-CM

## 2021-12-29 ENCOUNTER — APPOINTMENT (OUTPATIENT)
Dept: PEDIATRIC PULMONARY CYSTIC FIB | Facility: CLINIC | Age: 21
End: 2021-12-29

## 2021-12-30 ENCOUNTER — APPOINTMENT (OUTPATIENT)
Dept: INTERNAL MEDICINE | Facility: CLINIC | Age: 21
End: 2021-12-30

## 2022-01-04 ENCOUNTER — APPOINTMENT (OUTPATIENT)
Dept: CARDIOTHORACIC SURGERY | Facility: CLINIC | Age: 22
End: 2022-01-04

## 2022-01-10 ENCOUNTER — APPOINTMENT (OUTPATIENT)
Dept: INTERNAL MEDICINE | Facility: CLINIC | Age: 22
End: 2022-01-10

## 2022-02-27 ENCOUNTER — EMERGENCY (EMERGENCY)
Facility: HOSPITAL | Age: 22
LOS: 0 days | Discharge: HOME | End: 2022-02-27
Attending: STUDENT IN AN ORGANIZED HEALTH CARE EDUCATION/TRAINING PROGRAM | Admitting: STUDENT IN AN ORGANIZED HEALTH CARE EDUCATION/TRAINING PROGRAM
Payer: MEDICAID

## 2022-02-27 VITALS
HEART RATE: 61 BPM | RESPIRATION RATE: 22 BRPM | WEIGHT: 148.37 LBS | HEIGHT: 69 IN | OXYGEN SATURATION: 99 % | TEMPERATURE: 98 F | DIASTOLIC BLOOD PRESSURE: 80 MMHG | SYSTOLIC BLOOD PRESSURE: 128 MMHG

## 2022-02-27 DIAGNOSIS — R06.02 SHORTNESS OF BREATH: ICD-10-CM

## 2022-02-27 DIAGNOSIS — J45.901 UNSPECIFIED ASTHMA WITH (ACUTE) EXACERBATION: ICD-10-CM

## 2022-02-27 DIAGNOSIS — F17.200 NICOTINE DEPENDENCE, UNSPECIFIED, UNCOMPLICATED: ICD-10-CM

## 2022-02-27 DIAGNOSIS — R05.9 COUGH, UNSPECIFIED: ICD-10-CM

## 2022-02-27 DIAGNOSIS — R06.2 WHEEZING: ICD-10-CM

## 2022-02-27 LAB
RAPID RVP RESULT: SIGNIFICANT CHANGE UP
SARS-COV-2 RNA SPEC QL NAA+PROBE: SIGNIFICANT CHANGE UP

## 2022-02-27 PROCEDURE — 99285 EMERGENCY DEPT VISIT HI MDM: CPT

## 2022-02-27 RX ORDER — ALBUTEROL 90 UG/1
2 AEROSOL, METERED ORAL
Qty: 1 | Refills: 0
Start: 2022-02-27 | End: 2022-03-28

## 2022-02-27 RX ORDER — IPRATROPIUM/ALBUTEROL SULFATE 18-103MCG
3 AEROSOL WITH ADAPTER (GRAM) INHALATION ONCE
Refills: 0 | Status: COMPLETED | OUTPATIENT
Start: 2022-02-27 | End: 2022-02-27

## 2022-02-27 RX ORDER — FLUTICASONE PROPIONATE 220 MCG
1 AEROSOL WITH ADAPTER (GRAM) INHALATION
Qty: 1 | Refills: 0
Start: 2022-02-27 | End: 2022-03-28

## 2022-02-27 RX ORDER — ALBUTEROL 90 UG/1
2 AEROSOL, METERED ORAL
Qty: 1 | Refills: 0
Start: 2022-02-27 | End: 2022-05-08

## 2022-02-27 RX ADMIN — Medication 60 MILLIGRAM(S): at 02:14

## 2022-02-27 RX ADMIN — Medication 3 MILLILITER(S): at 02:14

## 2022-02-27 NOTE — ED PROVIDER NOTE - NSFOLLOWUPINSTRUCTIONS_ED_ALL_ED_FT
Recommend abstaining smoking in order to prevent further asthma exacerbations.    You may continue using your albuterol treatments every 4-6 hours until complete resolution of symptoms. Please continue taking prednisone 40 mg for the next 4 days until finished. Follow-up with pulmonology outpatient.    Asthma    Asthma is a recurring condition in which the airways tighten and narrow, making it difficult to breath. Asthma exacerbations, also called asthma attacks, range from minor to life-threatening. Symptoms include wheezing, coughing, chest tightness, or shortness of breath. The diagnosis of asthma is made by a review of your medical history and a physical exam, but may involve additional testing. Asthma cannot be cured, but medicines and lifestyle changes can help control it. Avoid triggers of asthma which may include animal dander, pollen, mold, smoke, air pollutants, etc.     SEEK IMMEDIATE MEDICAL CARE IF YOU HAVE THE FOLLOWING SYMPTOMS: worsening of symptoms, symptoms that do not respond to medication, shortness of breath at rest, chest pain, bluish discoloration to lips or fingertips, lightheadedness/dizziness, or fever.

## 2022-02-27 NOTE — ED PROVIDER NOTE - CARE PROVIDER_API CALL
Francisco Parry)  Pediatric Pulmonary Medicine; Pediatrics  [facility], [facility] [place] [address]  Phone: [phone]  Fax: [phone]  Established Patient  Follow Up Time: 4-6 Days

## 2022-02-27 NOTE — ED PROVIDER NOTE - PATIENT PORTAL LINK FT
You can access the FollowMyHealth Patient Portal offered by [facility] by registering at the following website: [email or web address]. By joining [employer]’s FollowMyHealth portal, you will also be able to view your health information using other applications (apps) compatible with our system.

## 2022-02-27 NOTE — ED ADULT TRIAGE NOTE - CHIEF COMPLAINT QUOTE
I'm having a hard time breathing, it doesn't feel like my asthma. I used my pump multiple times. I was here over a month ago and they said I had a gas bubble in my chest  (Pneumomediastinum)

## 2022-02-27 NOTE — ED PROVIDER NOTE - ATTENDING CONTRIBUTION TO CARE
21-year-old male with past medical history asthma, well controlled, last admission December 20-21 for pneumomediastinum, no acute intervention per presents the ER for worsening wheezing and shortness of breath.  Of note after last exacerbation he stopped smoking, but recently has been smoking marijuana mixed with tobacco.  He also lives with his father and brother who actively smoke in the house.  He also visits his girlfriend frequently who has 5 cats.  Denies recent Fevers, chills, productive cough.    Vitals noted on arrival, triage note reviewed.    Gen - Sitting comfortably, no acute distress, Head - NCAT, Pharynx - clear, MMM, Heart - RRR, no m/g/r, Lungs - Diffuse inspiratory and expiratory wheezing, adequate air entry bilateral, Abdomen - soft, NT, ND, Skin - No rash, Extremities - FROM, no edema, erythema, ecchymosis, brisk cap refill, Neuro - A&O x3, equal strength and sensation, non-focal exam    a/p:  peak expiratory flow 350  mild/moderate asthma exacerbation  Duo nebs, prednisone, reassess.  Smoking cessation education. 21-year-old male with past medical history asthma, well controlled, last admission December 20-21 for pneumomediastinum, improved w/o intervention who presents to the ER for worsening wheezing and shortness of breath.  Of note after last exacerbation he stopped smoking, but recently re-started smoking marijuana mixed with tobacco daily.  He also lives with his father and brother who actively smoke in the house.  He also visits his girlfriend frequently who has 5 cats.  Denies recent Fevers, chills, productive cough, and is not on steroids currently.     Vitals noted on arrival, triage note reviewed.    Gen - Sitting comfortably, no acute distress, Head - NCAT, Pharynx - clear, MMM, Heart - RRR, no m/g/r, Lungs - Diffuse inspiratory and expiratory wheezing, adequate air entry bilateral, Abdomen - soft, NT, ND, Skin - No rash, Extremities - FROM, no edema, erythema, ecchymosis, brisk cap refill, Neuro - A&O x3, equal strength and sensation, non-focal exam    a/p:  PEF 350cc prior to treatments  mild/moderate asthma exacerbation  Duo nebs, prednisone, reassess.  Smoking cessation education.

## 2022-02-27 NOTE — ED ADULT TRIAGE NOTE - WILL THE PATIENT ACCEPT THE PFIZER COVID-19 VACCINE IF ELIGIBLE AND IT IS AVAILABLE?
Not applicable Tenderness in the buttock. Calf tenderness. At an incision sight, there is no warmth, no induration, and no discharge.

## 2022-02-27 NOTE — ED PROVIDER NOTE - OBJECTIVE STATEMENT
Pt is a 21M with a pmhx of intermittent asthma (never intubated) p/w SOB and cough [id number]. Pt states he was admitted to the hospital for monitoring of pneumomediastinum and asthma exacerbation in Dec. 2021; the pneumomediastinum gradually resolved and pt was discharged home. He reports stopping smoking cigarettes, slowing down smoking marijuana, and avoiding his girlfriend's 5 cats which he is allergic to after that time for the first couple of months though he has began doing all of those things again in the past few weeks with development of occasional wheezing for the past few weeks. Tonight he was sleeping at his girlfriend's place with 5 cats and smoking marijuana/tobacco mix joints when he developed the current symptoms. No f/c/malaise, sore throat, runny nose, sick contacts, abd pain, n/v/d, or rash/itchiness.

## 2022-02-27 NOTE — ED PROVIDER NOTE - CLINICAL SUMMARY MEDICAL DECISION MAKING FREE TEXT BOX
21-year-old male with past medical history asthma, well controlled, last admission December 20-21 for pneumomediastinum, improved w/o intervention who presents to the ER for worsening wheezing and shortness of breath. PEF 350cc prior to treatments. Duoneb, prednisone given and pt with significant improvement on reassessment, adequate air entry and well appearing. Will dc with po prednisone and f/u with pulmonology. Smoking cessation education  given. I have fully discussed the medical management and delivery of care with the patient. I have discussed any available labs, imaging and treatment options with the patient. All Questions answered at the bedside and printed copies of all results provided and recommended to review with PCP. Patient confirms understanding and has been given detailed return precautions. Patient instructed to return to the ED should symptoms persist or worsen. Patient has demonstrated capacity and has verbalized understanding. Patient is well appearing upon discharge, ambulatory with a steady gait.

## 2022-02-27 NOTE — ED PROVIDER NOTE - NS ED ROS FT
CONSTITUTIONAL:  see HPI  SKIN:  no skin rash  EYES:  no visual changes  ENMT: no neck pain or stiffness  CARD:  no chest pain  RESP:  + wheezing, sob, and cough  ABD:  no abdominal pain, nausea, vomiting, or diarrhea  :  no dysuria, frequency or burning  MSK:  no back pain  NEURO:  no headache   Except as documented in the HPI,  all other systems are negative

## 2022-04-09 ENCOUNTER — EMERGENCY (EMERGENCY)
Facility: HOSPITAL | Age: 22
LOS: 0 days | Discharge: HOME | End: 2022-04-09
Attending: EMERGENCY MEDICINE | Admitting: EMERGENCY MEDICINE
Payer: MEDICAID

## 2022-04-09 VITALS
SYSTOLIC BLOOD PRESSURE: 121 MMHG | DIASTOLIC BLOOD PRESSURE: 84 MMHG | WEIGHT: 149.91 LBS | HEART RATE: 74 BPM | OXYGEN SATURATION: 98 % | TEMPERATURE: 98 F | RESPIRATION RATE: 20 BRPM

## 2022-04-09 DIAGNOSIS — F12.90 CANNABIS USE, UNSPECIFIED, UNCOMPLICATED: ICD-10-CM

## 2022-04-09 DIAGNOSIS — R06.02 SHORTNESS OF BREATH: ICD-10-CM

## 2022-04-09 DIAGNOSIS — R05.9 COUGH, UNSPECIFIED: ICD-10-CM

## 2022-04-09 DIAGNOSIS — R07.89 OTHER CHEST PAIN: ICD-10-CM

## 2022-04-09 DIAGNOSIS — J45.901 UNSPECIFIED ASTHMA WITH (ACUTE) EXACERBATION: ICD-10-CM

## 2022-04-09 DIAGNOSIS — F17.210 NICOTINE DEPENDENCE, CIGARETTES, UNCOMPLICATED: ICD-10-CM

## 2022-04-09 PROCEDURE — 71046 X-RAY EXAM CHEST 2 VIEWS: CPT | Mod: 26

## 2022-04-09 PROCEDURE — 99284 EMERGENCY DEPT VISIT MOD MDM: CPT

## 2022-04-09 RX ORDER — IPRATROPIUM/ALBUTEROL SULFATE 18-103MCG
3 AEROSOL WITH ADAPTER (GRAM) INHALATION ONCE
Refills: 0 | Status: COMPLETED | OUTPATIENT
Start: 2022-04-09 | End: 2022-04-09

## 2022-04-09 RX ORDER — DEXAMETHASONE 0.5 MG/5ML
10 ELIXIR ORAL ONCE
Refills: 0 | Status: COMPLETED | OUTPATIENT
Start: 2022-04-09 | End: 2022-04-09

## 2022-04-09 RX ORDER — ALBUTEROL 90 UG/1
2 AEROSOL, METERED ORAL EVERY 6 HOURS
Refills: 0 | Status: DISCONTINUED | OUTPATIENT
Start: 2022-04-09 | End: 2022-04-09

## 2022-04-09 RX ADMIN — Medication 3 MILLILITER(S): at 02:53

## 2022-04-09 RX ADMIN — Medication 3 MILLILITER(S): at 02:40

## 2022-04-09 RX ADMIN — Medication 10 MILLIGRAM(S): at 01:59

## 2022-04-09 RX ADMIN — Medication 3 MILLILITER(S): at 01:56

## 2022-04-09 RX ADMIN — ALBUTEROL 2 PUFF(S): 90 AEROSOL, METERED ORAL at 03:48

## 2022-04-09 NOTE — ED PROVIDER NOTE - PHYSICAL EXAMINATION
PHYSICAL EXAM:  General: Well developed; well nourished; in no acute distress    Respiratory:  Poor air entry bilaterally, minimal end expiratory wheezing   Cardiovascular: Regular rate and rhythm. S1 and S2 Normal; No murmurs, gallops or rubs  Abdominal: Soft non-tender non-distended; normal bowel sounds; no hepatosplenomegaly; no masses

## 2022-04-09 NOTE — ED PROVIDER NOTE - PATIENT PORTAL LINK FT
You can access the FollowMyHealth Patient Portal offered by [facility] by registering at the following website: [email or web address]. By joining [employer]’s FollowMyHealth portal, you will also be able to view your health information using other applications (apps) compatible with our system.

## 2022-04-09 NOTE — ED PROVIDER NOTE - NS ED ROS FT
REVIEW OF SYSTEMS:  CONSTITUTIONAL: No weakness, fevers or chills  EYES/ENT: No visual changes;  No vertigo or throat pain   NECK: No pain or stiffness  RESPIRATORY: + Cough, wheezing, SOB  CARDIOVASCULAR: No chest pain or palpitations  GASTROINTESTINAL: No abdominal or epigastric pain. No nausea, vomiting, or hematemesis; No diarrhea or constipation. No melena or hematochezia.  GENITOURINARY: No dysuria, frequency or hematuria  NEUROLOGICAL: No numbness or weakness  SKIN: No itching, rashes

## 2022-04-09 NOTE — ED ADULT NURSE NOTE - NSIMPLEMENTINTERV_GEN_ALL_ED
Implemented All Universal Safety Interventions:  [place] to call system. Call bell, personal items and telephone within reach. Instruct patient to call for assistance. Room bathroom lighting operational. Non-slip footwear when patient is off stretcher. Physically safe environment: no spills, clutter or unnecessary equipment. Stretcher in lowest position, wheels locked, appropriate side rails in place.

## 2022-04-09 NOTE — ED PROVIDER NOTE - NSFOLLOWUPCLINICS_GEN_ALL_ED_FT
[facility]  Medicine  [address], [place]   Phone: [phone]  Fax:   Follow Up Time: 1-3 Days

## 2022-04-09 NOTE — ED PROVIDER NOTE - ATTENDING CONTRIBUTION TO CARE
21-year-old male to ED with asthma exacerbation.  Patient has a longstanding poorly controlled asthma.  He does continue to smoke marijuana daily.  Also history of cigarette smoking.  No fevers no sick contacts no trauma or fall.  Otherwise well-appearing nontoxic.  Afebrile vital signs stable exam as noted wheezing bilaterally conjunctiva pink HEENT normal, regular rate and rhythm abdomen soft nontender and neuro nonfocal.

## 2023-03-09 NOTE — ED PROVIDER NOTE - NSCAREINITIATED _GEN_ER
Asc Procedure Text (A): After obtaining clear surgical margins the patient was sent to an ASC for surgical repair.  The patient understands they will receive post-surgical care and follow-up from the ASC physician. Julio Cesar Bond(Resident)

## 2023-04-20 ENCOUNTER — OUTPATIENT (OUTPATIENT)
Dept: OUTPATIENT SERVICES | Facility: HOSPITAL | Age: 23
LOS: 1 days | End: 2023-04-20
Payer: COMMERCIAL

## 2023-04-20 DIAGNOSIS — K02.9 DENTAL CARIES, UNSPECIFIED: ICD-10-CM

## 2023-04-20 PROCEDURE — D0140: CPT

## 2023-04-20 PROCEDURE — D0330: CPT

## 2023-04-21 DIAGNOSIS — K05.01 ACUTE GINGIVITIS, NON-PLAQUE INDUCED: ICD-10-CM

## 2023-07-05 NOTE — ED ADULT TRIAGE NOTE - CHIEF COMPLAINT QUOTE
pt sts I have had sore throat for the past few days and on my left side of the throat it feels like my lymph nodes are swollen, I also have asthma and I am coughing and  I don't have an asthma pump
No

## 2024-08-16 NOTE — ED PEDIATRIC NURSE NOTE - CAS TRG GEN SKIN CONDITION
Warm
Quality 226: Preventive Care And Screening: Tobacco Use: Screening And Cessation Intervention: Patient screened for tobacco use and is an ex/non-smoker
Quality 47: Advance Care Plan: Advance Care Planning discussed and documented in the medical record; patient did not wish or was not able to name a surrogate decision maker or provide an advance care plan.
Detail Level: Detailed

## 2024-11-13 NOTE — ED PEDIATRIC TRIAGE NOTE - HEART RATE (BEATS/MIN)
Pt stated he had been missing calls from the \"wellness nurse\". I did not see any messages. Told him I would get a message back to see who called and what was needed.     Call Back #[phone]   95

## 2024-11-23 NOTE — ED PEDIATRIC NURSE NOTE - NS ED PATIENT SAFETY CONCERN
Pt awake, alert, and interactive. encouraging PO, VS as per flowsheet. No S+S of respiratory distress, brisk cap refill. Safety maintained. Family at bedside. Plan of care ongoing. swabs sent to lab. No

## 2025-08-07 ENCOUNTER — EMERGENCY (EMERGENCY)
Facility: HOSPITAL | Age: 25
LOS: 0 days | Discharge: HOME | End: 2025-08-07
Attending: EMERGENCY MEDICINE
Payer: MEDICAID

## 2025-08-07 VITALS
SYSTOLIC BLOOD PRESSURE: 154 MMHG | RESPIRATION RATE: 18 BRPM | OXYGEN SATURATION: 99 % | DIASTOLIC BLOOD PRESSURE: 90 MMHG | WEIGHT: 149.91 LBS | HEART RATE: 110 BPM | HEIGHT: 69 IN | TEMPERATURE: 98 F

## 2025-08-07 DIAGNOSIS — J45.909 UNSPECIFIED ASTHMA, UNCOMPLICATED: ICD-10-CM

## 2025-08-07 DIAGNOSIS — Z87.891 PERSONAL HISTORY OF NICOTINE DEPENDENCE: ICD-10-CM

## 2025-08-07 DIAGNOSIS — L03.317 CELLULITIS OF BUTTOCK: ICD-10-CM

## 2025-08-07 DIAGNOSIS — M79.89 OTHER SPECIFIED SOFT TISSUE DISORDERS: ICD-10-CM

## 2025-08-07 PROCEDURE — 99284 EMERGENCY DEPT VISIT MOD MDM: CPT | Mod: 25

## 2025-08-07 PROCEDURE — 99284 EMERGENCY DEPT VISIT MOD MDM: CPT

## 2025-08-07 RX ORDER — CEPHALEXIN 250 MG/1
1 CAPSULE ORAL
Qty: 28 | Refills: 0
Start: 2025-08-07 | End: 2025-08-13